# Patient Record
Sex: MALE | Race: BLACK OR AFRICAN AMERICAN | NOT HISPANIC OR LATINO | Employment: OTHER | URBAN - METROPOLITAN AREA
[De-identification: names, ages, dates, MRNs, and addresses within clinical notes are randomized per-mention and may not be internally consistent; named-entity substitution may affect disease eponyms.]

---

## 2023-07-13 ENCOUNTER — OFFICE VISIT (OUTPATIENT)
Dept: PRIMARY CARE | Facility: CLINIC | Age: 68
End: 2023-07-13
Payer: MEDICARE

## 2023-07-13 ENCOUNTER — LAB (OUTPATIENT)
Dept: LAB | Facility: LAB | Age: 68
End: 2023-07-13
Payer: MEDICARE

## 2023-07-13 VITALS
HEIGHT: 69 IN | DIASTOLIC BLOOD PRESSURE: 86 MMHG | WEIGHT: 196 LBS | SYSTOLIC BLOOD PRESSURE: 128 MMHG | HEART RATE: 83 BPM | BODY MASS INDEX: 29.03 KG/M2

## 2023-07-13 DIAGNOSIS — Z12.5 SCREENING FOR PROSTATE CANCER: ICD-10-CM

## 2023-07-13 DIAGNOSIS — Z00.00 ENCOUNTER FOR PREVENTIVE HEALTH EXAMINATION: ICD-10-CM

## 2023-07-13 DIAGNOSIS — Z00.00 ENCOUNTER FOR INITIAL PREVENTIVE PHYSICAL EXAMINATION COVERED BY MEDICARE: Primary | ICD-10-CM

## 2023-07-13 DIAGNOSIS — Z12.11 ENCOUNTER FOR SCREENING FOR MALIGNANT NEOPLASM OF COLON: ICD-10-CM

## 2023-07-13 DIAGNOSIS — Z23 NEED FOR VACCINATION: ICD-10-CM

## 2023-07-13 DIAGNOSIS — Z11.59 ENCOUNTER FOR HEPATITIS C SCREENING TEST FOR LOW RISK PATIENT: ICD-10-CM

## 2023-07-13 DIAGNOSIS — E78.5 HYPERLIPIDEMIA, UNSPECIFIED HYPERLIPIDEMIA TYPE: ICD-10-CM

## 2023-07-13 LAB
ALANINE AMINOTRANSFERASE (SGPT) (U/L) IN SER/PLAS: 30 U/L (ref 10–52)
ALBUMIN (G/DL) IN SER/PLAS: 4.5 G/DL (ref 3.4–5)
ALKALINE PHOSPHATASE (U/L) IN SER/PLAS: 61 U/L (ref 33–136)
ANION GAP IN SER/PLAS: 14 MMOL/L (ref 10–20)
ASPARTATE AMINOTRANSFERASE (SGOT) (U/L) IN SER/PLAS: 21 U/L (ref 9–39)
BILIRUBIN TOTAL (MG/DL) IN SER/PLAS: 0.8 MG/DL (ref 0–1.2)
CALCIUM (MG/DL) IN SER/PLAS: 9.3 MG/DL (ref 8.6–10.3)
CARBON DIOXIDE, TOTAL (MMOL/L) IN SER/PLAS: 24 MMOL/L (ref 21–32)
CHLORIDE (MMOL/L) IN SER/PLAS: 106 MMOL/L (ref 98–107)
CHOLESTEROL (MG/DL) IN SER/PLAS: 164 MG/DL (ref 0–199)
CHOLESTEROL IN HDL (MG/DL) IN SER/PLAS: 50.8 MG/DL
CHOLESTEROL/HDL RATIO: 3.2
CREATININE (MG/DL) IN SER/PLAS: 1.15 MG/DL (ref 0.5–1.3)
ERYTHROCYTE DISTRIBUTION WIDTH (RATIO) BY AUTOMATED COUNT: 13.3 % (ref 11.5–14.5)
ERYTHROCYTE MEAN CORPUSCULAR HEMOGLOBIN CONCENTRATION (G/DL) BY AUTOMATED: 32.4 G/DL (ref 32–36)
ERYTHROCYTE MEAN CORPUSCULAR VOLUME (FL) BY AUTOMATED COUNT: 87 FL (ref 80–100)
ERYTHROCYTES (10*6/UL) IN BLOOD BY AUTOMATED COUNT: 4.39 X10E12/L (ref 4.5–5.9)
ESTIMATED AVERAGE GLUCOSE FOR HBA1C: 131 MG/DL
GFR MALE: 70 ML/MIN/1.73M2
GLUCOSE (MG/DL) IN SER/PLAS: 75 MG/DL (ref 74–99)
HEMATOCRIT (%) IN BLOOD BY AUTOMATED COUNT: 38.3 % (ref 41–52)
HEMOGLOBIN (G/DL) IN BLOOD: 12.4 G/DL (ref 13.5–17.5)
HEMOGLOBIN A1C/HEMOGLOBIN TOTAL IN BLOOD: 6.2 %
HEPATITIS C VIRUS AB PRESENCE IN SERUM: NONREACTIVE
LDL: 101 MG/DL (ref 0–99)
LEUKOCYTES (10*3/UL) IN BLOOD BY AUTOMATED COUNT: 3.1 X10E9/L (ref 4.4–11.3)
PLATELETS (10*3/UL) IN BLOOD AUTOMATED COUNT: 218 X10E9/L (ref 150–450)
POTASSIUM (MMOL/L) IN SER/PLAS: 4.6 MMOL/L (ref 3.5–5.3)
PROSTATE SPECIFIC ANTIGEN,SCREEN: 0.51 NG/ML (ref 0–4)
PROTEIN TOTAL: 7.4 G/DL (ref 6.4–8.2)
SODIUM (MMOL/L) IN SER/PLAS: 139 MMOL/L (ref 136–145)
THYROTROPIN (MIU/L) IN SER/PLAS BY DETECTION LIMIT <= 0.05 MIU/L: 4.07 MIU/L (ref 0.44–3.98)
THYROXINE (T4) FREE (NG/DL) IN SER/PLAS: 0.61 NG/DL (ref 0.61–1.12)
TRIGLYCERIDE (MG/DL) IN SER/PLAS: 61 MG/DL (ref 0–149)
UREA NITROGEN (MG/DL) IN SER/PLAS: 17 MG/DL (ref 6–23)
VLDL: 12 MG/DL (ref 0–40)

## 2023-07-13 PROCEDURE — 1157F ADVNC CARE PLAN IN RCRD: CPT | Performed by: STUDENT IN AN ORGANIZED HEALTH CARE EDUCATION/TRAINING PROGRAM

## 2023-07-13 PROCEDURE — 1036F TOBACCO NON-USER: CPT | Performed by: STUDENT IN AN ORGANIZED HEALTH CARE EDUCATION/TRAINING PROGRAM

## 2023-07-13 PROCEDURE — G0009 ADMIN PNEUMOCOCCAL VACCINE: HCPCS | Performed by: STUDENT IN AN ORGANIZED HEALTH CARE EDUCATION/TRAINING PROGRAM

## 2023-07-13 PROCEDURE — 1170F FXNL STATUS ASSESSED: CPT | Performed by: STUDENT IN AN ORGANIZED HEALTH CARE EDUCATION/TRAINING PROGRAM

## 2023-07-13 PROCEDURE — 36415 COLL VENOUS BLD VENIPUNCTURE: CPT

## 2023-07-13 PROCEDURE — 86803 HEPATITIS C AB TEST: CPT

## 2023-07-13 PROCEDURE — 80061 LIPID PANEL: CPT

## 2023-07-13 PROCEDURE — G0402 INITIAL PREVENTIVE EXAM: HCPCS | Performed by: STUDENT IN AN ORGANIZED HEALTH CARE EDUCATION/TRAINING PROGRAM

## 2023-07-13 PROCEDURE — 80053 COMPREHEN METABOLIC PANEL: CPT

## 2023-07-13 PROCEDURE — 83036 HEMOGLOBIN GLYCOSYLATED A1C: CPT

## 2023-07-13 PROCEDURE — 84443 ASSAY THYROID STIM HORMONE: CPT

## 2023-07-13 PROCEDURE — 1160F RVW MEDS BY RX/DR IN RCRD: CPT | Performed by: STUDENT IN AN ORGANIZED HEALTH CARE EDUCATION/TRAINING PROGRAM

## 2023-07-13 PROCEDURE — 85027 COMPLETE CBC AUTOMATED: CPT

## 2023-07-13 PROCEDURE — 1159F MED LIST DOCD IN RCRD: CPT | Performed by: STUDENT IN AN ORGANIZED HEALTH CARE EDUCATION/TRAINING PROGRAM

## 2023-07-13 PROCEDURE — 99203 OFFICE O/P NEW LOW 30 MIN: CPT | Performed by: STUDENT IN AN ORGANIZED HEALTH CARE EDUCATION/TRAINING PROGRAM

## 2023-07-13 PROCEDURE — 84439 ASSAY OF FREE THYROXINE: CPT

## 2023-07-13 PROCEDURE — 90732 PPSV23 VACC 2 YRS+ SUBQ/IM: CPT | Performed by: STUDENT IN AN ORGANIZED HEALTH CARE EDUCATION/TRAINING PROGRAM

## 2023-07-13 PROCEDURE — G0103 PSA SCREENING: HCPCS

## 2023-07-13 PROCEDURE — 3008F BODY MASS INDEX DOCD: CPT | Performed by: STUDENT IN AN ORGANIZED HEALTH CARE EDUCATION/TRAINING PROGRAM

## 2023-07-13 RX ORDER — ROSUVASTATIN CALCIUM 5 MG/1
5 TABLET, COATED ORAL DAILY
COMMUNITY
End: 2023-07-14 | Stop reason: SDUPTHER

## 2023-07-13 ASSESSMENT — PATIENT HEALTH QUESTIONNAIRE - PHQ9
1. LITTLE INTEREST OR PLEASURE IN DOING THINGS: NOT AT ALL
2. FEELING DOWN, DEPRESSED OR HOPELESS: NOT AT ALL
SUM OF ALL RESPONSES TO PHQ9 QUESTIONS 1 AND 2: 0

## 2023-07-13 ASSESSMENT — ENCOUNTER SYMPTOMS
LOSS OF SENSATION IN FEET: 0
DEPRESSION: 0
OCCASIONAL FEELINGS OF UNSTEADINESS: 0

## 2023-07-13 ASSESSMENT — ACTIVITIES OF DAILY LIVING (ADL)
MANAGING_FINANCES: INDEPENDENT
TAKING_MEDICATION: INDEPENDENT
DRESSING: INDEPENDENT
GROCERY_SHOPPING: INDEPENDENT
DOING_HOUSEWORK: INDEPENDENT
BATHING: INDEPENDENT

## 2023-07-13 NOTE — PROGRESS NOTES
Subjective   Reason for Visit: Michael Chavez is an 67 y.o. male here for a Medicare Wellness visit.     Past Medical, Surgical, and Family History reviewed and updated in chart.    Reviewed all medications by prescribing practitioner or clinical pharmacist (such as prescriptions, OTCs, herbal therapies and supplements) and documented in the medical record.    HPI  Patient moved here from [place].  It has been about 3 years since he last saw his primary doctor, so it has been about that amount of time since he had any fasting lab work.  He did present fasting today in order to get his usual health maintenance labs.  He has been on rosuvastatin for some time now for cholesterol.  He used to be on 10 mg and he cut that back to 5 mg the last time he saw his doctor.  He has been trying to conserve his medication, so he has not really been taking it every day lately.  He is interested to see what his cholesterol looks like.  He will need a refill.    Of note, he states that he has had some palpitations over the last few months that he thinks is related to drinking too much caffeine.  He has scheduled an appointment with a cardiologist for further investigation of these palpitations already.  He has not had any syncopal episodes, denies chest pain, shortness of breath, headaches, visual changes, weakness, numbness, tingling, lower extremity edema.  Patient Self Assessment of Health Status  Patient Self Assessment: Good    Nutrition and Exercise  Current Diet: Well Balanced Diet  Adequate Fluid Intake: Yes  Caffeine: Yes  Exercise Frequency: Infrequently    Functional Ability/Level of Safety  Cognitive Impairment Observed: No cognitive impairment observed  Cognitive Impairment Reported: No cognitive impairment reported by patient or family    Home Safety Risk Factors: None    Patient Care Team:  Connie Knott DO as PCP - General (Family Medicine)     Review of Systems  All pertinent positive symptoms are included  "in history of present illness.    All other systems have been reviewed and are negative and noncontributory to this patient's current ailments.  Objective   Vitals:  /86   Pulse 83   Ht 1.753 m (5' 9\")   Wt 88.9 kg (196 lb)   BMI 28.94 kg/m²       Physical Exam  CONSTITUTIONAL - INAD. Not ill appearing.  SKIN - No lesions or rashes visualized. Good skin turgor.  HEAD - Atraumatic, normocephalic.  EYES - EOMI, NAINA  ENT - EACs clear. TMs intact. Nasal turbinates without erythema or discharge. Uvula midline, oropharynx nonerythematous and without exudate.  NECK - Supple and without rigidity. Thyroid midline and without masses.  RESP - CTAB. No wheezing, rhonchi, or crackles.   CARDIAC - RRR. No murmurs, gallops, or rubs.  ABDOMEN - Soft, nontender, nondistended. No organomegaly.   EXTREMITIES -  No edema or cyanosis  MUSCULOSKELETAL - No joint swelling or deformities. Good muscle tone in the UE/LE bilaterally.  NEUROLOGICAL - CNs 2-12 grossly intact. Reflexes intact in the LE.  PSYCHIATRIC - Displays normal mood and affect.     Assessment/Plan   Diagnoses and all orders for this visit:  Initial Medicare annual wellness visit  Encounter for preventive health examination  A complete history and physical was performed today.  Fasting labs ordered today include:  -     CBC; Future  -     Comprehensive Metabolic Panel; Future  -     Hemoglobin A1C; Future  -     Lipid Panel; Future  -     TSH with reflex to Free T4 if abnormal; Future  -     Hepatitis C Antibody; Future  Screening for prostate cancer  -     Prostate Spec.Ag,Screen; Future  Need for vaccination  -     Pneumococcal polysaccharide vaccine, 23-valent, age 2 years and older (PNEUMOVAX 23)  Encounter for screening for malignant neoplasm of colon  -     Colonoscopy; Future  Hyperlipidemia, unspecified hyperlipidemia type  Patient currently taking rosuvastatin 5 mg most days of the week.  Will hold off on the refill until his lipid panel results  BMI " 28.0-28.9,adult  Continue a well-balanced diet with regular physical activity

## 2023-07-14 DIAGNOSIS — D64.9 ANEMIA, UNSPECIFIED TYPE: ICD-10-CM

## 2023-07-14 DIAGNOSIS — R79.89 ABNORMAL TSH: Primary | ICD-10-CM

## 2023-07-14 DIAGNOSIS — E78.00 PURE HYPERCHOLESTEROLEMIA: ICD-10-CM

## 2023-07-14 RX ORDER — ROSUVASTATIN CALCIUM 5 MG/1
5 TABLET, COATED ORAL DAILY
Qty: 90 TABLET | Refills: 1 | Status: SHIPPED | OUTPATIENT
Start: 2023-07-14 | End: 2024-01-08

## 2023-10-12 ENCOUNTER — LAB (OUTPATIENT)
Dept: LAB | Facility: LAB | Age: 68
End: 2023-10-12
Payer: MEDICARE

## 2023-10-12 DIAGNOSIS — D64.9 ANEMIA, UNSPECIFIED TYPE: ICD-10-CM

## 2023-10-12 DIAGNOSIS — R79.89 ABNORMAL TSH: ICD-10-CM

## 2023-10-12 LAB
BASOPHILS # BLD AUTO: 0.04 X10*3/UL (ref 0–0.1)
BASOPHILS NFR BLD AUTO: 1.3 %
EOSINOPHIL # BLD AUTO: 0.05 X10*3/UL (ref 0–0.7)
EOSINOPHIL NFR BLD AUTO: 1.7 %
ERYTHROCYTE [DISTWIDTH] IN BLOOD BY AUTOMATED COUNT: 13.2 % (ref 11.5–14.5)
FERRITIN SERPL-MCNC: 388 NG/ML (ref 20–300)
HCT VFR BLD AUTO: 39.7 % (ref 41–52)
HGB BLD-MCNC: 13.5 G/DL (ref 13.5–17.5)
IMM GRANULOCYTES # BLD AUTO: 0 X10*3/UL (ref 0–0.7)
IMM GRANULOCYTES NFR BLD AUTO: 0 % (ref 0–0.9)
IRON SATN MFR SERPL: 42 % (ref 25–45)
IRON SERPL-MCNC: 143 UG/DL (ref 35–150)
LYMPHOCYTES # BLD AUTO: 1.04 X10*3/UL (ref 1.2–4.8)
LYMPHOCYTES NFR BLD AUTO: 34.9 %
MCH RBC QN AUTO: 29.7 PG (ref 26–34)
MCHC RBC AUTO-ENTMCNC: 34 G/DL (ref 32–36)
MCV RBC AUTO: 87 FL (ref 80–100)
MONOCYTES # BLD AUTO: 0.43 X10*3/UL (ref 0.1–1)
MONOCYTES NFR BLD AUTO: 14.4 %
NEUTROPHILS # BLD AUTO: 1.42 X10*3/UL (ref 1.2–7.7)
NEUTROPHILS NFR BLD AUTO: 47.7 %
NRBC BLD-RTO: 0 /100 WBCS (ref 0–0)
PLATELET # BLD AUTO: 207 X10*3/UL (ref 150–450)
PMV BLD AUTO: 11.1 FL (ref 7.5–11.5)
RBC # BLD AUTO: 4.55 X10*6/UL (ref 4.5–5.9)
TIBC SERPL-MCNC: 337 UG/DL (ref 240–445)
TSH SERPL-ACNC: 2.52 MIU/L (ref 0.44–3.98)
UIBC SERPL-MCNC: 194 UG/DL (ref 110–370)
VIT B12 SERPL-MCNC: 462 PG/ML (ref 211–911)
WBC # BLD AUTO: 3 X10*3/UL (ref 4.4–11.3)

## 2023-10-12 PROCEDURE — 84443 ASSAY THYROID STIM HORMONE: CPT

## 2023-10-12 PROCEDURE — 83550 IRON BINDING TEST: CPT

## 2023-10-12 PROCEDURE — 83540 ASSAY OF IRON: CPT

## 2023-10-12 PROCEDURE — 85025 COMPLETE CBC W/AUTO DIFF WBC: CPT

## 2023-10-12 PROCEDURE — 36415 COLL VENOUS BLD VENIPUNCTURE: CPT

## 2023-10-12 PROCEDURE — 82607 VITAMIN B-12: CPT

## 2023-10-12 PROCEDURE — 82728 ASSAY OF FERRITIN: CPT

## 2023-10-31 ENCOUNTER — LAB (OUTPATIENT)
Dept: LAB | Facility: LAB | Age: 68
End: 2023-10-31
Payer: MEDICARE

## 2023-10-31 ENCOUNTER — OFFICE VISIT (OUTPATIENT)
Dept: PRIMARY CARE | Facility: CLINIC | Age: 68
End: 2023-10-31
Payer: MEDICARE

## 2023-10-31 VITALS
HEIGHT: 69 IN | DIASTOLIC BLOOD PRESSURE: 82 MMHG | HEART RATE: 110 BPM | BODY MASS INDEX: 27.99 KG/M2 | WEIGHT: 189 LBS | SYSTOLIC BLOOD PRESSURE: 126 MMHG

## 2023-10-31 DIAGNOSIS — R53.83 OTHER FATIGUE: Primary | ICD-10-CM

## 2023-10-31 DIAGNOSIS — R73.03 PREDIABETES: ICD-10-CM

## 2023-10-31 DIAGNOSIS — R53.83 OTHER FATIGUE: ICD-10-CM

## 2023-10-31 PROCEDURE — 1159F MED LIST DOCD IN RCRD: CPT | Performed by: STUDENT IN AN ORGANIZED HEALTH CARE EDUCATION/TRAINING PROGRAM

## 2023-10-31 PROCEDURE — 36415 COLL VENOUS BLD VENIPUNCTURE: CPT

## 2023-10-31 PROCEDURE — 84403 ASSAY OF TOTAL TESTOSTERONE: CPT

## 2023-10-31 PROCEDURE — 83036 HEMOGLOBIN GLYCOSYLATED A1C: CPT

## 2023-10-31 PROCEDURE — 99213 OFFICE O/P EST LOW 20 MIN: CPT | Performed by: STUDENT IN AN ORGANIZED HEALTH CARE EDUCATION/TRAINING PROGRAM

## 2023-10-31 PROCEDURE — 1160F RVW MEDS BY RX/DR IN RCRD: CPT | Performed by: STUDENT IN AN ORGANIZED HEALTH CARE EDUCATION/TRAINING PROGRAM

## 2023-10-31 PROCEDURE — 3008F BODY MASS INDEX DOCD: CPT | Performed by: STUDENT IN AN ORGANIZED HEALTH CARE EDUCATION/TRAINING PROGRAM

## 2023-10-31 PROCEDURE — 1036F TOBACCO NON-USER: CPT | Performed by: STUDENT IN AN ORGANIZED HEALTH CARE EDUCATION/TRAINING PROGRAM

## 2023-10-31 NOTE — PROGRESS NOTES
"Michael Chavez is a 67 y.o. year old male presenting for Follow-up       HPI:  Patient states that for the last 1 to 2 months, he has been feeling pretty fatigued to the point where he feels like he needs a nap at some point during the day.  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  He will go to bed around 5 AM and sleep until about 1 PM.  When he wakes up, he feels rested and ready for the day, but at the 4 to 5-hour lulu after being awake, he just feels like he needs a nap.  This is unusual for him and is starting to be concerning to him.  He is wondering if it could be diabetes at this point since his A1c in July was elevated.  He never did schedule his colonoscopy and we know that he does have some anemia.  He does not snore and he does not have a headache when he wakes.  ROS:   All pertinent positive symptoms are included in history of present illness.    All other systems have been reviewed and are negative and noncontributory to this patient's current ailments.    Current Outpatient Medications   Medication Sig Dispense Refill    rosuvastatin (Crestor) 5 mg tablet Take 1 tablet (5 mg) by mouth once daily. 90 tablet 1     No current facility-administered medications for this visit.       OBJECTIVE  Visit Vitals  /82   Pulse 110   Ht 1.753 m (5' 9\")   Wt 85.7 kg (189 lb)   BMI 27.91 kg/m²   Smoking Status Never   BSA 2.04 m²        Physical Exam:  GENERAL: Alert, oriented, pleasant, in no acute distress  HEENT: Head normocephalic, atraumatic  EXTREMITIES: no edema, no cyanosis  PSYCH: Appropriate mood and affect  SKIN: No rashes or lesions appreciated    Assessment/Plan   Diagnoses and all orders for this visit:  Other fatigue  His anemia has improved with the iron supplementation, but he is still feeling fatigued.  We will go ahead and rule out testosterone deficiency as well as an increase in his A1c.  I did encourage him to schedule that " colonoscopy since he has been having these issues.  Does not sound like some sort of sleep disorder.  Thyroid function was normal, B12 was normal.  I recommend he also start a vitamin D supplement at this time  -     Testosterone; Future  -     Hemoglobin A1C; Future

## 2023-11-01 LAB
EST. AVERAGE GLUCOSE BLD GHB EST-MCNC: 123 MG/DL
HBA1C MFR BLD: 5.9 %
TESTOST SERPL-MCNC: 377 NG/DL (ref 240–1000)

## 2023-11-02 DIAGNOSIS — Z12.11 SPECIAL SCREENING FOR MALIGNANT NEOPLASMS, COLON: ICD-10-CM

## 2023-11-02 RX ORDER — POLYETHYLENE GLYCOL 3350, SODIUM SULFATE ANHYDROUS, SODIUM BICARBONATE, SODIUM CHLORIDE, POTASSIUM CHLORIDE 236; 22.74; 6.74; 5.86; 2.97 G/4L; G/4L; G/4L; G/4L; G/4L
POWDER, FOR SOLUTION ORAL
Qty: 4000 ML | Refills: 0 | Status: SHIPPED | OUTPATIENT
Start: 2023-11-02

## 2023-11-06 ENCOUNTER — OFFICE VISIT (OUTPATIENT)
Dept: HEMATOLOGY/ONCOLOGY | Facility: CLINIC | Age: 68
End: 2023-11-06
Payer: MEDICARE

## 2023-11-06 ENCOUNTER — TELEPHONE (OUTPATIENT)
Dept: PRIMARY CARE | Facility: CLINIC | Age: 68
End: 2023-11-06
Payer: MEDICARE

## 2023-11-06 DIAGNOSIS — D64.9 ANEMIA, UNSPECIFIED TYPE: ICD-10-CM

## 2023-11-06 DIAGNOSIS — D64.9 ANEMIA, UNSPECIFIED TYPE: Primary | ICD-10-CM

## 2023-11-06 NOTE — TELEPHONE ENCOUNTER
Pt calling requesting a referral for the Hematologist. He states he is concerned about the anemia as he has been taking iron for a while.

## 2023-11-15 ENCOUNTER — OFFICE VISIT (OUTPATIENT)
Dept: GASTROENTEROLOGY | Facility: EXTERNAL LOCATION | Age: 68
End: 2023-11-15
Payer: MEDICARE

## 2023-11-15 DIAGNOSIS — K57.30 DIVERTICULOSIS OF LARGE INTESTINE WITHOUT DIVERTICULITIS: ICD-10-CM

## 2023-11-15 DIAGNOSIS — K64.8 OTHER HEMORRHOIDS: Primary | ICD-10-CM

## 2023-11-15 DIAGNOSIS — K62.89 OTHER SPECIFIED DISEASES OF ANUS AND RECTUM: ICD-10-CM

## 2023-11-15 DIAGNOSIS — D12.5 BENIGN NEOPLASM OF SIGMOID COLON: ICD-10-CM

## 2023-11-15 DIAGNOSIS — Z12.11 ENCOUNTER FOR SCREENING FOR MALIGNANT NEOPLASM OF COLON: ICD-10-CM

## 2023-11-15 DIAGNOSIS — D12.8 BENIGN NEOPLASM OF RECTUM: ICD-10-CM

## 2023-11-15 PROCEDURE — 1036F TOBACCO NON-USER: CPT | Performed by: INTERNAL MEDICINE

## 2023-11-15 PROCEDURE — 88305 TISSUE EXAM BY PATHOLOGIST: CPT

## 2023-11-15 PROCEDURE — 1160F RVW MEDS BY RX/DR IN RCRD: CPT | Performed by: INTERNAL MEDICINE

## 2023-11-15 PROCEDURE — 3008F BODY MASS INDEX DOCD: CPT | Performed by: INTERNAL MEDICINE

## 2023-11-15 PROCEDURE — 45385 COLONOSCOPY W/LESION REMOVAL: CPT | Performed by: INTERNAL MEDICINE

## 2023-11-15 PROCEDURE — 0753T DGTZ GLS MCRSCP SLD LEVEL IV: CPT

## 2023-11-15 PROCEDURE — 1159F MED LIST DOCD IN RCRD: CPT | Performed by: INTERNAL MEDICINE

## 2023-11-16 ENCOUNTER — LAB REQUISITION (OUTPATIENT)
Dept: LAB | Facility: HOSPITAL | Age: 68
End: 2023-11-16
Payer: MEDICARE

## 2023-11-28 ENCOUNTER — TELEPHONE (OUTPATIENT)
Dept: PRIMARY CARE | Facility: CLINIC | Age: 68
End: 2023-11-28
Payer: MEDICARE

## 2023-11-28 DIAGNOSIS — D72.819 LEUKOPENIA, UNSPECIFIED TYPE: Primary | ICD-10-CM

## 2023-11-28 DIAGNOSIS — R79.89 ELEVATED FERRITIN: ICD-10-CM

## 2023-11-28 PROBLEM — K62.9 ANORECTAL DISORDER: Status: ACTIVE | Noted: 2023-11-28

## 2023-11-28 PROBLEM — D12.5 BENIGN NEOPLASM OF SIGMOID COLON: Status: ACTIVE | Noted: 2023-11-28

## 2023-11-28 PROBLEM — R73.03 PREDIABETES: Status: ACTIVE | Noted: 2023-10-31

## 2023-11-28 PROBLEM — R53.83 FATIGUE: Status: ACTIVE | Noted: 2023-10-31

## 2023-11-28 PROBLEM — D12.8 BENIGN NEOPLASM OF RECTUM: Status: ACTIVE | Noted: 2023-11-28

## 2023-11-28 PROBLEM — K64.9 HEMORRHOIDS: Status: ACTIVE | Noted: 2023-11-28

## 2023-11-28 PROBLEM — E66.3 OVERWEIGHT WITH BODY MASS INDEX (BMI) 25.0-29.9: Status: ACTIVE | Noted: 2023-11-28

## 2023-11-28 PROBLEM — D64.9 ANEMIA: Status: ACTIVE | Noted: 2023-10-12

## 2023-11-28 PROBLEM — I10 ESSENTIAL (PRIMARY) HYPERTENSION: Status: ACTIVE | Noted: 2023-07-26

## 2023-11-28 RX ORDER — LIDOCAINE HYDROCHLORIDE 20 MG/ML
SOLUTION OROPHARYNGEAL
COMMUNITY

## 2023-11-28 NOTE — TELEPHONE ENCOUNTER
Pt has apt with hematology in two days.   He was requesting lab work from us prior so he can have results for that apt.

## 2023-11-29 ENCOUNTER — LAB (OUTPATIENT)
Dept: LAB | Facility: LAB | Age: 68
End: 2023-11-29
Payer: MEDICARE

## 2023-11-29 DIAGNOSIS — R79.89 ELEVATED FERRITIN: ICD-10-CM

## 2023-11-29 DIAGNOSIS — D72.819 LEUKOPENIA, UNSPECIFIED TYPE: ICD-10-CM

## 2023-11-29 LAB
BASOPHILS # BLD AUTO: 0.04 X10*3/UL (ref 0–0.1)
BASOPHILS NFR BLD AUTO: 1.1 %
EOSINOPHIL # BLD AUTO: 0.08 X10*3/UL (ref 0–0.7)
EOSINOPHIL NFR BLD AUTO: 2.3 %
ERYTHROCYTE [DISTWIDTH] IN BLOOD BY AUTOMATED COUNT: 12.8 % (ref 11.5–14.5)
FERRITIN SERPL-MCNC: 422 NG/ML (ref 20–300)
HCT VFR BLD AUTO: 42.1 % (ref 41–52)
HGB BLD-MCNC: 13.9 G/DL (ref 13.5–17.5)
IMM GRANULOCYTES # BLD AUTO: 0.01 X10*3/UL (ref 0–0.7)
IMM GRANULOCYTES NFR BLD AUTO: 0.3 % (ref 0–0.9)
LYMPHOCYTES # BLD AUTO: 1.31 X10*3/UL (ref 1.2–4.8)
LYMPHOCYTES NFR BLD AUTO: 37.3 %
MCH RBC QN AUTO: 29.4 PG (ref 26–34)
MCHC RBC AUTO-ENTMCNC: 33 G/DL (ref 32–36)
MCV RBC AUTO: 89 FL (ref 80–100)
MONOCYTES # BLD AUTO: 0.39 X10*3/UL (ref 0.1–1)
MONOCYTES NFR BLD AUTO: 11.1 %
NEUTROPHILS # BLD AUTO: 1.68 X10*3/UL (ref 1.2–7.7)
NEUTROPHILS NFR BLD AUTO: 47.9 %
NRBC BLD-RTO: 0 /100 WBCS (ref 0–0)
PLATELET # BLD AUTO: 214 X10*3/UL (ref 150–450)
RBC # BLD AUTO: 4.72 X10*6/UL (ref 4.5–5.9)
WBC # BLD AUTO: 3.5 X10*3/UL (ref 4.4–11.3)

## 2023-11-29 PROCEDURE — 36415 COLL VENOUS BLD VENIPUNCTURE: CPT

## 2023-11-29 PROCEDURE — 82728 ASSAY OF FERRITIN: CPT

## 2023-11-29 PROCEDURE — 85025 COMPLETE CBC W/AUTO DIFF WBC: CPT

## 2023-11-30 ENCOUNTER — OFFICE VISIT (OUTPATIENT)
Dept: HEMATOLOGY/ONCOLOGY | Facility: CLINIC | Age: 68
End: 2023-11-30
Payer: MEDICARE

## 2023-11-30 VITALS
OXYGEN SATURATION: 95 % | HEART RATE: 95 BPM | DIASTOLIC BLOOD PRESSURE: 94 MMHG | SYSTOLIC BLOOD PRESSURE: 139 MMHG | BODY MASS INDEX: 27.74 KG/M2 | HEIGHT: 69 IN | WEIGHT: 187.28 LBS | RESPIRATION RATE: 18 BRPM | TEMPERATURE: 98.6 F

## 2023-11-30 DIAGNOSIS — D72.819 LEUKOPENIA, UNSPECIFIED TYPE: Primary | ICD-10-CM

## 2023-11-30 LAB
ALBUMIN SERPL BCP-MCNC: 4.5 G/DL (ref 3.4–5)
ALP SERPL-CCNC: 51 U/L (ref 33–136)
ALT SERPL W P-5'-P-CCNC: 19 U/L (ref 10–52)
ANION GAP SERPL CALC-SCNC: 17 MMOL/L (ref 10–20)
AST SERPL W P-5'-P-CCNC: 15 U/L (ref 9–39)
BILIRUB SERPL-MCNC: 1.1 MG/DL (ref 0–1.2)
BUN SERPL-MCNC: 14 MG/DL (ref 6–23)
CALCIUM SERPL-MCNC: 9.4 MG/DL (ref 8.6–10.3)
CHLORIDE SERPL-SCNC: 105 MMOL/L (ref 98–107)
CO2 SERPL-SCNC: 23 MMOL/L (ref 21–32)
CREAT SERPL-MCNC: 1.12 MG/DL (ref 0.5–1.3)
GFR SERPL CREATININE-BSD FRML MDRD: 73 ML/MIN/1.73M*2
GLUCOSE SERPL-MCNC: 95 MG/DL (ref 74–99)
IRON SATN MFR SERPL: 28 % (ref 25–45)
IRON SERPL-MCNC: 91 UG/DL (ref 35–150)
POTASSIUM SERPL-SCNC: 4.2 MMOL/L (ref 3.5–5.3)
PROT SERPL-MCNC: 7 G/DL (ref 6.4–8.2)
SODIUM SERPL-SCNC: 141 MMOL/L (ref 136–145)
TIBC SERPL-MCNC: 322 UG/DL (ref 240–445)
UIBC SERPL-MCNC: 231 UG/DL (ref 110–370)

## 2023-11-30 PROCEDURE — 3008F BODY MASS INDEX DOCD: CPT | Performed by: PHYSICIAN ASSISTANT

## 2023-11-30 PROCEDURE — 86235 NUCLEAR ANTIGEN ANTIBODY: CPT | Performed by: PHYSICIAN ASSISTANT

## 2023-11-30 PROCEDURE — 3080F DIAST BP >= 90 MM HG: CPT | Performed by: PHYSICIAN ASSISTANT

## 2023-11-30 PROCEDURE — 99205 OFFICE O/P NEW HI 60 MIN: CPT | Performed by: PHYSICIAN ASSISTANT

## 2023-11-30 PROCEDURE — 1036F TOBACCO NON-USER: CPT | Performed by: PHYSICIAN ASSISTANT

## 2023-11-30 PROCEDURE — 3075F SYST BP GE 130 - 139MM HG: CPT | Performed by: PHYSICIAN ASSISTANT

## 2023-11-30 PROCEDURE — 1160F RVW MEDS BY RX/DR IN RCRD: CPT | Performed by: PHYSICIAN ASSISTANT

## 2023-11-30 PROCEDURE — 1159F MED LIST DOCD IN RCRD: CPT | Performed by: PHYSICIAN ASSISTANT

## 2023-11-30 PROCEDURE — 1125F AMNT PAIN NOTED PAIN PRSNT: CPT | Performed by: PHYSICIAN ASSISTANT

## 2023-11-30 PROCEDURE — 99215 OFFICE O/P EST HI 40 MIN: CPT | Mod: PO | Performed by: PHYSICIAN ASSISTANT

## 2023-11-30 PROCEDURE — 36415 COLL VENOUS BLD VENIPUNCTURE: CPT | Performed by: PHYSICIAN ASSISTANT

## 2023-11-30 ASSESSMENT — ENCOUNTER SYMPTOMS
LOSS OF SENSATION IN FEET: 0
DEPRESSION: 0
OCCASIONAL FEELINGS OF UNSTEADINESS: 0

## 2023-11-30 ASSESSMENT — COLUMBIA-SUICIDE SEVERITY RATING SCALE - C-SSRS
6. HAVE YOU EVER DONE ANYTHING, STARTED TO DO ANYTHING, OR PREPARED TO DO ANYTHING TO END YOUR LIFE?: NO
2. HAVE YOU ACTUALLY HAD ANY THOUGHTS OF KILLING YOURSELF?: NO
1. IN THE PAST MONTH, HAVE YOU WISHED YOU WERE DEAD OR WISHED YOU COULD GO TO SLEEP AND NOT WAKE UP?: NO

## 2023-11-30 ASSESSMENT — PAIN SCALES - GENERAL: PAINLEVEL: 5

## 2023-11-30 NOTE — PROGRESS NOTES
"Reason for Visit  Michael Chavez is a 67 y.o. AA male referred by Dr. Knott for evaluation and management of leucopenia w/o neutropenia or lymphopenia.     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      History of Present Illness:  Patient recently moved her from [place] reports he has had leucopenia for most of his life. Noted to have anemia in 7/2023 and self prescribed oral iron. He takes 2-3 tabs a day. No iron studies drawn at the time.  Anemia corrected and in 10/2023, labs drawn and noted to have elevated ferritin (388) and iron stat of 42%. Patient had C-scope (11/15/2023) had sigmoid colon and rectal polyp.    On assessment, notes fatigue but doesn't have conventional sleep wake cycle. He works at night and sleeps from 5am to 1pm. He notes 8lb weight loss in 2 month mounika. Notes back pain and stressful job. Denies B symptoms, frequent infections or any other symptoms at this time.    Review of Systems: All of the systems have been reviewed and are negative for complaints except what is stated in the HPI and/or past medical history.    Allergies and Intolerances:  No Known Allergies           Outpatient Medication Profile:  Current Outpatient Medications   Medication Sig Dispense Refill    lidocaine (Lidocaine Viscous) 2 % solution       polyethylene glycol-electrolytes (Golytely) 420 gram solution Begin drinking first half of prep 6pm the night before procedure. Start second half 5 hours before procedure time. 4000 mL 0    rosuvastatin (Crestor) 5 mg tablet Take 1 tablet (5 mg) by mouth once daily. 90 tablet 1     No current facility-administered medications for this visit.        Vitals and Measurements:   Visit Vitals  BP (!) 139/94 (BP Location: Left arm, Patient Position: Sitting, BP Cuff Size: Adult)   Pulse 95   Temp 37 °C (98.6 °F) (Temporal)   Resp 18        Physical Exam: " "  Constitutional: alert, awake and oriented, not in acute distress   HEENT: moist mucous membranes, normal nose   Neck: supple, no lymphadenopathy   EYES: PERRL, EOM intact, conjunctiva normal  Skin: no jaundice, rash or erythema  Neurological: AAOx3, no gross focal deficit   Psychiatric: normal mood and behavior     Labs:  Lab Results   Component Value Date    WBC 3.5 (L) 11/29/2023    NEUTROABS 1.68 11/29/2023    IGABSOL 0.01 11/29/2023    LYMPHSABS 1.31 11/29/2023    MONOSABS 0.39 11/29/2023    EOSABS 0.08 11/29/2023    BASOSABS 0.04 11/29/2023    RBC 4.72 11/29/2023    MCV 89 11/29/2023    MCHC 33.0 11/29/2023    HGB 13.9 11/29/2023    HCT 42.1 11/29/2023     11/29/2023     No results found for: \"RETICCTPCT\"   Lab Results   Component Value Date    CREATININE 1.12 11/30/2023    BUN 14 11/30/2023    EGFR 73 11/30/2023     11/30/2023    K 4.2 11/30/2023     11/30/2023    CO2 23 11/30/2023      Lab Results   Component Value Date    ALT 19 11/30/2023    AST 15 11/30/2023    ALKPHOS 51 11/30/2023    BILITOT 1.1 11/30/2023      Lab Results   Component Value Date    TSH 2.52 10/12/2023     Lab Results   Component Value Date    TSH 2.52 10/12/2023     Lab Results   Component Value Date    IRON 91 11/30/2023    TIBC 322 11/30/2023    FERRITIN 422 (H) 11/29/2023      Lab Results   Component Value Date    MLIXUSKV31 462 10/12/2023      Assessment:    67 y.o. AA male referred for evaluation and management of leucopenia w/o neutropenia or lymphopenia.     Plan:    Reviewed and discussed lab, imaging, and pathology results with patient in detail as well as diagnosis, prognosis, and treatment options.    Will send workup for leucopenia    Discussed completing anemia work up. Unclear if patient had PRATIBHA since iron labs weren't drawn prior to patient starting oral iron.    Discussed role of BMBx    Recommend that he try to modify his sleep cycle if he can since it can be cause of fatigue.    F/U w/PCP    Patient " verbalized understanding, and all his questions were answered to her satisfaction    60 min spent with patient greater than 50 % of which was spent in consultation and coordination of care.

## 2023-12-01 LAB
HAV AB SER QL IA: NONREACTIVE
HBV CORE AB SER QL: NONREACTIVE
HBV SURFACE AB SER-ACNC: <3.1 MIU/ML
HBV SURFACE AG SERPL QL IA: NONREACTIVE
HCV AB SER QL: NONREACTIVE
IGA SERPL-MCNC: 131 MG/DL (ref 70–400)
IGG SERPL-MCNC: 1340 MG/DL (ref 700–1600)
IGM SERPL-MCNC: 41 MG/DL (ref 40–230)
KAPPA LC SERPL-MCNC: 1.69 MG/DL (ref 0.33–1.94)
KAPPA LC/LAMBDA SER: 1.32 {RATIO} (ref 0.26–1.65)
LAMBDA LC SERPL-MCNC: 1.28 MG/DL (ref 0.57–2.63)
PROT SERPL-MCNC: 7.5 G/DL (ref 6.4–8.2)
RHEUMATOID FACT SER NEPH-ACNC: <10 IU/ML (ref 0–15)

## 2023-12-03 LAB
ANA PATTERN: ABNORMAL
ANA SER QL HEP2 SUBST: POSITIVE
ANA TITR SER IF: ABNORMAL {TITER}

## 2023-12-04 LAB
CENTROMERE B AB SER-ACNC: <0.2 AI
CHROMATIN AB SERPL-ACNC: <0.2 AI
DSDNA AB SER-ACNC: <1 IU/ML
ELECTRONICALLY SIGNED BY: NORMAL
ENA JO1 AB SER QL IA: <0.2 AI
ENA RNP AB SER IA-ACNC: 0.2 AI
ENA SCL70 AB SER QL IA: <0.2 AI
ENA SM AB SER IA-ACNC: <0.2 AI
ENA SM+RNP AB SER QL IA: <0.2 AI
ENA SS-A AB SER IA-ACNC: <0.2 AI
ENA SS-B AB SER IA-ACNC: <0.2 AI
HFE GENE MUT TESTED BLD/T: NORMAL
HFE P.C282Y BLD/T QL: NORMAL
HFE P.H63D BLD/T QL: NORMAL
RIBOSOMAL P AB SER-ACNC: <0.2 AI

## 2023-12-05 LAB
LABORATORY COMMENT REPORT: NORMAL
PATH REPORT.FINAL DX SPEC: NORMAL
PATH REPORT.GROSS SPEC: NORMAL
PATH REPORT.RELEVANT HX SPEC: NORMAL
PATH REPORT.TOTAL CANCER: NORMAL

## 2023-12-08 LAB
ALBUMIN: 4.7 G/DL (ref 3.4–5)
ALPHA 1 GLOBULIN: 0.3 G/DL (ref 0.2–0.6)
ALPHA 2 GLOBULIN: 0.6 G/DL (ref 0.4–1.1)
BETA GLOBULIN: 0.9 G/DL (ref 0.5–1.2)
GAMMA GLOBULIN: 1.1 G/DL (ref 0.5–1.4)
IMMUNOFIXATION COMMENT: ABNORMAL
M-PROTEIN 1: 0.1 G/DL
PATH REVIEW - SERUM IMMUNOFIXATION: ABNORMAL
PATH REVIEW-SERUM PROTEIN ELECTROPHORESIS: ABNORMAL
PROTEIN ELECTROPHORESIS COMMENT: ABNORMAL

## 2023-12-19 ENCOUNTER — APPOINTMENT (OUTPATIENT)
Dept: HEMATOLOGY/ONCOLOGY | Facility: CLINIC | Age: 68
End: 2023-12-19
Payer: COMMERCIAL

## 2023-12-20 ENCOUNTER — TELEMEDICINE (OUTPATIENT)
Dept: HEMATOLOGY/ONCOLOGY | Facility: CLINIC | Age: 68
End: 2023-12-20
Payer: MEDICARE

## 2023-12-20 DIAGNOSIS — D72.819 LEUKOPENIA, UNSPECIFIED TYPE: Primary | ICD-10-CM

## 2023-12-20 PROCEDURE — 99214 OFFICE O/P EST MOD 30 MIN: CPT | Performed by: PHYSICIAN ASSISTANT

## 2023-12-21 NOTE — PROGRESS NOTES
"Reason for Visit  Michael Chavez is a 68 y.o. AA male referred by Dr. Knott for evaluation and management of leucopenia w/o neutropenia or lymphopenia.     Patient recently moved her from [place] reports he has had leucopenia for most of his life. Noted to have anemia in 7/2023 and self prescribed oral iron. He takes 2-3 tabs a day. No iron studies drawn at the time.  Anemia corrected and in 10/2023, labs drawn and noted to have elevated ferritin (388) and iron stat of 42%. Patient had C-scope (11/15/2023) had sigmoid colon and rectal polyp.    On assessment, notes fatigue but doesn't have conventional sleep wake cycle. He works at night and sleeps from 5am to 1pm. He notes 8lb weight loss in 2 month mounika. Notes back pain and stressful job. Denies B symptoms, frequent infections or any other symptoms at this time.      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      History of Present Illness:  Patient stopped oral iron and feels better. States that he notes a \"sweet smell on himself\". Otherwise doing well.    Review of Systems: All of the systems have been reviewed and are negative for complaints except what is stated in the HPI and/or past medical history.    Allergies and Intolerances:  No Known Allergies           Outpatient Medication Profile:  Current Outpatient Medications   Medication Sig Dispense Refill    lidocaine (Lidocaine Viscous) 2 % solution       polyethylene glycol-electrolytes (Golytely) 420 gram solution Begin drinking first half of prep 6pm the night before procedure. Start second half 5 hours before procedure time. 4000 mL 0    rosuvastatin (Crestor) 5 mg tablet Take 1 tablet (5 mg) by mouth once daily. 90 tablet 1     No current facility-administered medications for this visit.        Vitals and Measurements:   There were no vitals taken for this visit.       Physical Exam: "   Deferred due to telehealth    Office Visit on 11/30/2023   Component Date Value Ref Range Status    Glucose 11/30/2023 95  74 - 99 mg/dL Final    Sodium 11/30/2023 141  136 - 145 mmol/L Final    Potassium 11/30/2023 4.2  3.5 - 5.3 mmol/L Final    Chloride 11/30/2023 105  98 - 107 mmol/L Final    Bicarbonate 11/30/2023 23  21 - 32 mmol/L Final    Anion Gap 11/30/2023 17  10 - 20 mmol/L Final    Urea Nitrogen 11/30/2023 14  6 - 23 mg/dL Final    Creatinine 11/30/2023 1.12  0.50 - 1.30 mg/dL Final    eGFR 11/30/2023 73  >60 mL/min/1.73m*2 Final    Calculations of estimated GFR are performed using the 2021 CKD-EPI Study Refit equation without the race variable for the IDMS-Traceable creatinine methods.  https://jasn.asnjournals.org/content/early/2021/09/22/ASN.9216731259    Calcium 11/30/2023 9.4  8.6 - 10.3 mg/dL Final    Albumin 11/30/2023 4.5  3.4 - 5.0 g/dL Final    Alkaline Phosphatase 11/30/2023 51  33 - 136 U/L Final    Total Protein 11/30/2023 7.0  6.4 - 8.2 g/dL Final    AST 11/30/2023 15  9 - 39 U/L Final    Bilirubin, Total 11/30/2023 1.1  0.0 - 1.2 mg/dL Final    ALT 11/30/2023 19  10 - 52 U/L Final    Patients treated with Sulfasalazine may generate falsely decreased results for ALT.    Iron 11/30/2023 91  35 - 150 ug/dL Final    UIBC 11/30/2023 231  110 - 370 ug/dL Final    TIBC 11/30/2023 322  240 - 445 ug/dL Final    % Saturation 11/30/2023 28  25 - 45 % Final    Hepatitis A  AB-Total 11/30/2023 Nonreactive  Nonreactive Final    Hepatitis B Core AB- Total 11/30/2023 Nonreactive  Nonreactive Final    Hepatitis B Surface AB 11/30/2023 <3.1  <10.0 mIU/mL Final    Interpretive Criteria:                         <10 mIU/mL    Nonreactive                          >=10 mIU/mL    Reactive     Biotin interference may cause falsely decreased results. Patients taking a Biotin dose of up to 5 mg/day should refrain from taking Biotin for 24 hours before sample collection. Providers may contact their local laboratory  for further information.    Hepatitis B Surface AG 11/30/2023 Nonreactive  Nonreactive Final    Biotin interference may cause falsely decreased results. Patients taking a Biotin dose of up to 5 mg/day should refrain from taking Biotin for 24 hours before sample collection. Providers may contact their local laboratory for  further information.    Hepatitis C AB 11/30/2023 Nonreactive  Nonreactive Final    Results from patients taking biotin supplements or receiving high-dose biotin therapy should be interpreted with caution due to possible interference with this test. Providers may contact their local laboratory for further information.    Ig [place] Free Light Chain 11/30/2023 1.69  0.33 - 1.94 mg/dL Final    Ig Lambda Free Light Chain 11/30/2023 1.28  0.57 - 2.63 mg/dL Final    Kappa/Lambda Ratio 11/30/2023 1.32  0.26 - 1.65 Final    IgG 11/30/2023 1,340  700 - 1,600 mg/dL Final    IgA 11/30/2023 131  70 - 400 mg/dL Final    IgM 11/30/2023 41  40 - 230 mg/dL Final    Hemochromatosis H63D Result 11/30/2023 Normal  Normal Final    Hemochromatosis C282Y Result 11/30/2023 Normal  Normal Final    Hemochromatosis Interpretation 11/30/2023    Final                    Value:This result contains rich text formatting which cannot be displayed here.    Electronically signed and reported* 11/30/2023    Final                    Value:Elizabeth Quintanilla MD PhD [facility]    SARINA 11/30/2023 Positive (A)  Negative Final    The Antinuclear Antibody (SARINA) test was performed using  indirect immunofluorescence assay with HEp-2 cells slide.    SARINA Pattern 11/30/2023 Homogeneous   Final    SARINA Titer 11/30/2023 1:320   Final    Rheumatoid Factor 11/30/2023 <10  0 - 15 IU/mL Final    Total Protein 11/30/2023 7.5  6.4 - 8.2 g/dL Final    Albumin 11/30/2023 4.7  3.4 - 5.0 g/dL Final    Alpha 1 Globulin 11/30/2023 0.3  0.2 - 0.6 g/dL Final    Alpha 2 Globulin 11/30/2023 0.6  0.4 - 1.1 g/dL Final    Beta Globulin 11/30/2023 0.9  0.5 - 1.2 g/dL Final     Gamma 11/30/2023 1.1  0.5 - 1.4 g/dL Final    M-PROTEIN 1 11/30/2023 0.1 (H)    g/dL Final    Protein Electrophoresis Comment 11/30/2023 Aberrant band detected. See immunofixation.   Final    Immunofixation Comment 11/30/2023 11/30/23 Monoclonal IgG lambda in the beta region at 0.1 g/dL.       Repeat testing is recommended after the resolution of any acute illness to monitor the evolution of this band.      Final    Path Review - Serum Protein Electr* 11/30/2023 Reviewed and approved by KEEGAN YU on 12/8/23 at 1:30 PM.       Final    Path Review - Serum Immunofixation 11/30/2023 Reviewed and approved by KEEGAN YU on 12/8/23 at 1:30 PM.       Final    Anti-SM 11/30/2023 <0.2  <1.0 AI Final    < 1.0 = NEGATIVE  >=1.0 = POSITIVE    Anti-RNP 11/30/2023 0.2  <1.0 AI Final    < 1.0 = NEGATIVE  >=1.0 = POSITIVE    Anti-SM/RNP 11/30/2023 <0.2  <1.0 AI Final    < 1.0 = NEGATIVE  >=1.0 = POSITIVE    Anti-SSA 11/30/2023 <0.2  <1.0 AI Final    < 1.0 = NEGATIVE  >=1.0 = POSITIVE    Anti-SSB 11/30/2023 <0.2  <1.0 AI Final    < 1.0 = NEGATIVE  >=1.0 = POSITIVE    Anti-SCL-70 11/30/2023 <0.2  <1.0 AI Final    < 1.0 = NEGATIVE  >=1.0 = POSITIVE    Anti-AKIL-1 IgG 11/30/2023 <0.2  <1.0 AI Final    < 1.0 = NEGATIVE  >=1.0 = POSITIVE    Anti-Chromatin 11/30/2023 <0.2  <1.0 AI Final    < 1.0 = NEGATIVE  >=1.0 = POSITIVE    Anti-Centromere 11/30/2023 <0.2  <1.0 AI Final    < 1.0 = NEGATIVE  >=1.0 = POSITIVE    ANTI-RIBOSOMAL P 11/30/2023 <0.2  <1.0 AI Final    < 1.0 = NEGATIVE  >=1.0 = POSITIVE    Anti-DNA (DS) 11/30/2023 <1.0  <5.0 IU/mL Final    NEGATIVE: <= 4 IU/ML  EQUIVOCAL: 5- 9 IU/ML  POSITIVE: >=10 IU/ML   Lab on 11/29/2023   Component Date Value Ref Range Status    WBC 11/29/2023 3.5 (L)  4.4 - 11.3 x10*3/uL Final    nRBC 11/29/2023 0.0  0.0 - 0.0 /100 WBCs Final    RBC 11/29/2023 4.72  4.50 - 5.90 x10*6/uL Final    Hemoglobin 11/29/2023 13.9  13.5 - 17.5 g/dL Final    Hematocrit 11/29/2023 42.1  41.0 - 52.0 % Final     MCV 11/29/2023 89  80 - 100 fL Final    MCH 11/29/2023 29.4  26.0 - 34.0 pg Final    MCHC 11/29/2023 33.0  32.0 - 36.0 g/dL Final    RDW 11/29/2023 12.8  11.5 - 14.5 % Final    Platelets 11/29/2023 214  150 - 450 x10*3/uL Final    Neutrophils % 11/29/2023 47.9  40.0 - 80.0 % Final    Immature Granulocytes %, Automated 11/29/2023 0.3  0.0 - 0.9 % Final    Immature Granulocyte Count (IG) includes promyelocytes, myelocytes and metamyelocytes but does not include bands. Percent differential counts (%) should be interpreted in the context of the absolute cell counts (cells/UL).    Lymphocytes % 11/29/2023 37.3  13.0 - 44.0 % Final    Monocytes % 11/29/2023 11.1  2.0 - 10.0 % Final    Eosinophils % 11/29/2023 2.3  0.0 - 6.0 % Final    Basophils % 11/29/2023 1.1  0.0 - 2.0 % Final    Neutrophils Absolute 11/29/2023 1.68  1.20 - 7.70 x10*3/uL Final    Percent differential counts (%) should be interpreted in the context of the absolute cell counts (cells/uL).    Immature Granulocytes Absolute, Au* 11/29/2023 0.01  0.00 - 0.70 x10*3/uL Final    Lymphocytes Absolute 11/29/2023 1.31  1.20 - 4.80 x10*3/uL Final    Monocytes Absolute 11/29/2023 0.39  0.10 - 1.00 x10*3/uL Final    Eosinophils Absolute 11/29/2023 0.08  0.00 - 0.70 x10*3/uL Final    Basophils Absolute 11/29/2023 0.04  0.00 - 0.10 x10*3/uL Final    Ferritin 11/29/2023 422 (H)  20 - 300 ng/mL Final   Lab Requisition on 11/15/2023   Component Date Value Ref Range Status    Case Report 11/15/2023    Final                    Value:Surgical Pathology                                Case: [id number]                                  Authorizing Provider:  Brad Yang MD        Collected:           11/15/2023 1224              Ordering Location:     [facility]       Received:            11/16/2023 2222                                     Center                                                                       Pathologist:           Meliton Nazario,                                                                            MD                                                                           Specimens:   A) - COLON - SIGMOID POLYP, COLON, SIGMOID POLYP, COLD SNARE, POLYPECTOMY                           B) - RECTUM POLYPECTOMY, COLON, RECTUM POLYP, COLD SNARE, POLYPECTOMY                      FINAL DIAGNOSIS 11/15/2023    Final                    Value:This result contains rich text formatting which cannot be displayed here.      11/15/2023    Final                    Value:This result contains rich text formatting which cannot be displayed here.    Clinical History 11/15/2023    Final                    Value:This result contains rich text formatting which cannot be displayed here.    Gross Description 11/15/2023    Final                    Value:This result contains rich text formatting which cannot be displayed here.   Lab on 10/31/2023   Component Date Value Ref Range Status    Testosterone 10/31/2023 377  240 - 1,000 ng/dL Final    Hemoglobin A1C 10/31/2023 5.9 (H)  see below % Final    Hemoglobin variant detected which does not interfere with determination of Hemoglobin A1c. Hemoglobin identification can be ordered to characterize the variant if clinically indicated.    Estimated Average Glucose 10/31/2023 123  Not Established mg/dL Final   Lab on 10/12/2023   Component Date Value Ref Range Status    Thyroid Stimulating Hormone 10/12/2023 2.52  0.44 - 3.98 mIU/L Final    WBC 10/12/2023 3.0 (L)  4.4 - 11.3 x10*3/uL Final    nRBC 10/12/2023 0.0  0.0 - 0.0 /100 WBCs Final    RBC 10/12/2023 4.55  4.50 - 5.90 x10*6/uL Final    Hemoglobin 10/12/2023 13.5  13.5 - 17.5 g/dL Final    Hematocrit 10/12/2023 39.7 (L)  41.0 - 52.0 % Final    MCV 10/12/2023 87  80 - 100 fL Final    MCH 10/12/2023 29.7  26.0 - 34.0 pg Final    MCHC 10/12/2023 34.0  32.0 - 36.0 g/dL Final    RDW 10/12/2023 13.2  11.5 - 14.5 % Final    Platelets 10/12/2023 207  150 - 450 x10*3/uL  Final    MPV 10/12/2023 11.1  7.5 - 11.5 fL Final    Neutrophils % 10/12/2023 47.7  40.0 - 80.0 % Final    Immature Granulocytes %, Automated 10/12/2023 0.0  0.0 - 0.9 % Final    Immature Granulocyte Count (IG) includes promyelocytes, myelocytes and metamyelocytes but does not include bands. Percent differential counts (%) should be interpreted in the context of the absolute cell counts (cells/UL).    Lymphocytes % 10/12/2023 34.9  13.0 - 44.0 % Final    Monocytes % 10/12/2023 14.4  2.0 - 10.0 % Final    Eosinophils % 10/12/2023 1.7  0.0 - 6.0 % Final    Basophils % 10/12/2023 1.3  0.0 - 2.0 % Final    Neutrophils Absolute 10/12/2023 1.42  1.20 - 7.70 x10*3/uL Final    Percent differential counts (%) should be interpreted in the context of the absolute cell counts (cells/uL).    Immature Granulocytes Absolute, Au* 10/12/2023 0.00  0.00 - 0.70 x10*3/uL Final    Lymphocytes Absolute 10/12/2023 1.04 (L)  1.20 - 4.80 x10*3/uL Final    Monocytes Absolute 10/12/2023 0.43  0.10 - 1.00 x10*3/uL Final    Eosinophils Absolute 10/12/2023 0.05  0.00 - 0.70 x10*3/uL Final    Basophils Absolute 10/12/2023 0.04  0.00 - 0.10 x10*3/uL Final    Iron 10/12/2023 143  35 - 150 ug/dL Final    UIBC 10/12/2023 194  110 - 370 ug/dL Final    TIBC 10/12/2023 337  240 - 445 ug/dL Final    % Saturation 10/12/2023 42  25 - 45 % Final    Ferritin 10/12/2023 388 (H)  20 - 300 ng/mL Final    Vitamin B12 10/12/2023 462  211 - 911 pg/mL Final   Lab on 07/13/2023   Component Date Value Ref Range Status    WBC 07/13/2023 3.1 (L)  4.4 - 11.3 x10E9/L Final    RBC 07/13/2023 4.39 (L)  4.50 - 5.90 x10E12/L Final    Hemoglobin 07/13/2023 12.4 (L)  13.5 - 17.5 g/dL Final    Hematocrit 07/13/2023 38.3 (L)  41.0 - 52.0 % Final    MCV 07/13/2023 87  80 - 100 fL Final    MCHC 07/13/2023 32.4  32.0 - 36.0 g/dL Final    Platelets 07/13/2023 218  150 - 450 x10E9/L Final    RDW 07/13/2023 13.3  11.5 - 14.5 % Final    Glucose 07/13/2023 75  74 - 99 mg/dL Final     Sodium 07/13/2023 139  136 - 145 mmol/L Final    Potassium 07/13/2023 4.6  3.5 - 5.3 mmol/L Final    Chloride 07/13/2023 106  98 - 107 mmol/L Final    Bicarbonate 07/13/2023 24  21 - 32 mmol/L Final    Anion Gap 07/13/2023 14  10 - 20 mmol/L Final    Urea Nitrogen 07/13/2023 17  6 - 23 mg/dL Final    Creatinine 07/13/2023 1.15  0.50 - 1.30 mg/dL Final    GFR MALE 07/13/2023 70  >90 mL/min/1.73m2 Final     CALCULATIONS OF ESTIMATED GFR ARE PERFORMED   USING THE 2021 CKD-EPI STUDY REFIT EQUATION   WITHOUT THE RACE VARIABLE FOR THE IDMS-TRACEABLE   CREATININE METHODS.    https://jasn.asnjournals.org/content/early/2021/09/22/ASN.2068709016    Calcium 07/13/2023 9.3  8.6 - 10.3 mg/dL Final    Albumin 07/13/2023 4.5  3.4 - 5.0 g/dL Final    Alkaline Phosphatase 07/13/2023 61  33 - 136 U/L Final    Total Protein 07/13/2023 7.4  6.4 - 8.2 g/dL Final    AST 07/13/2023 21  9 - 39 U/L Final    Total Bilirubin 07/13/2023 0.8  0.0 - 1.2 mg/dL Final    ALT (SGPT) 07/13/2023 30  10 - 52 U/L Final     Patients treated with Sulfasalazine may generate    falsely decreased results for ALT.    Hemoglobin A1C 07/13/2023 6.2 (A)  % Final         Diagnosis of Diabetes-Adults   Non-Diabetic: < or = 5.6%   Increased risk for developing diabetes: 5.7-6.4%   Diagnostic of diabetes: > or = 6.5%  .       Monitoring of Diabetes                Age (y)     Therapeutic Goal (%)   Adults:          >18           <7.0   Pediatrics:    13-18           <7.5                   7-12           <8.0                   0- 6            7.5-8.5   American Diabetes Association. Diabetes Care 33(S1), Jan 2010.    Estimated Average Glucose 07/13/2023 131  MG/DL Final    Cholesterol 07/13/2023 164  0 - 199 mg/dL Final    .      AGE      DESIRABLE   BORDERLINE HIGH   HIGH     0-19 Y     0 - 169       170 - 199     >/= 200    20-24 Y     0 - 189       190 - 224     >/= 225         >24 Y     0 - 199       200 - 239     >/= 240   **All ranges are based on fasting  samples. Specific   therapeutic targets will vary based on patient-specific   cardiac risk.  .   Pediatric guidelines reference:Pediatrics 2011, 128(S5).   Adult guidelines reference: NCEP ATPIII Guidelines,     AMANDA 2001, 258:2486-97  .   Venipuncture immediately after or during the    administration of Metamizole may lead to falsely   low results. Testing should be performed immediately   prior to Metamizole dosing.    HDL 07/13/2023 50.8  mg/dL Final    .      AGE      VERY LOW   LOW     NORMAL    HIGH       0-19 Y       < 35   < 40     40-45     ----    20-24 Y       ----   < 40       >45     ----      >24 Y       ----   < 40     40-60      >60  .    Cholesterol/HDL Ratio 07/13/2023 3.2   Final    REF VALUES  DESIRABLE  < 3.4  HIGH RISK  > 5.0    LDL 07/13/2023 101 (H)  0 - 99 mg/dL Final    .                           NEAR      BORD      AGE      DESIRABLE  OPTIMAL    HIGH     HIGH     VERY HIGH     0-19 Y     0 - 109     ---    110-129   >/= 130     ----    20-24 Y     0 - 119     ---    120-159   >/= 160     ----      >24 Y     0 -  99   100-129  130-159   160-189     >/=190  .    VLDL 07/13/2023 12  0 - 40 mg/dL Final    Triglycerides 07/13/2023 61  0 - 149 mg/dL Final    .      AGE      DESIRABLE   BORDERLINE HIGH   HIGH     VERY HIGH   0 D-90 D    19 - 174         ----         ----        ----  91 D- 9 Y     0 -  74        75 -  99     >/= 100      ----    10-19 Y     0 -  89        90 - 129     >/= 130      ----    20-24 Y     0 - 114       115 - 149     >/= 150      ----         >24 Y     0 - 149       150 - 199    200- 499    >/= 500  .   Venipuncture immediately after or during the    administration of Metamizole may lead to falsely   low results. Testing should be performed immediately   prior to Metamizole dosing.    TSH 07/13/2023 4.07 (H)  0.44 - 3.98 mIU/L Final     TSH testing is performed using different testing    methodology at [facility] than at other    [facility]. Direct  result comparisons should    only be made within the same method.    Hepatitis C Ab 07/13/2023 NONREACTIVE  NONREACTIVE Final     Results from patients taking biotin supplements or receiving   high-dose biotin therapy should be interpreted with caution   due to possible interference with this test. Providers may    contact their local laboratory for further information.    Prostate Specific Antigen,Screen 07/13/2023 0.51  0.00 - 4.00 ng/mL Final    The FDA requires that the method used for PSA assay be   reported to the physician. Values obtained with different   assay methods must not be used interchangeably. This test  was performed at [facility] using the Access   Hybritech PSA assay is a two-site immunoenzymatic sandwich   assay. The assay is approved for measurement of   prostate-specific antigen (PSA)in serum and may be used   in conjunction with a digital rectal examination in men   50 years and older as an aid in detection of prostate   cancer.  5-Alpha-reductase inhibitors (e.g. Proscar, Finasteride,   Avodart, Dutasteride and Ingris) for the treatment of BPH   have been shown to lower PSA levels by an average of 50%   after 6 months of treatment.    Free T4 07/13/2023 0.61  0.61 - 1.12 ng/dL Final     Thyroxine Free testing is performed using different testing    methodology at [facility] than at other    [facility]. Direct result comparisons should    only be made within the same method.  .   Biotin can cause falsely elevated free T4 results. Patients   taking a Biotin dose of up to 10 mg/day should refrain from   taking Biotin for 24 hours before sample collection. Patient   taking a Biotin dose of >10 mg/day should consult with their   physician or the laboratory before the blood draw.     Assessment:    67 y.o. AA male referred for evaluation and management of leucopenia w/o neutropenia or lymphopenia.     Plan:    Reviewed and discussed lab, imaging, and pathology  results with patient in detail as well as diagnosis, prognosis, and treatment options.    Long standing h/o leucopenia likely benign    Discussed role of BMBx    Recommend that he try to modify his sleep cycle if he can since it can be cause of fatigue.    F/U w/PCP    RTC in 6 months    Patient verbalized understanding, and all his questions were answered to her satisfaction    30 min spent with patient greater than 50 % of which was spent in consultation and coordination of care via telehealth.

## 2024-01-06 DIAGNOSIS — E78.00 PURE HYPERCHOLESTEROLEMIA: ICD-10-CM

## 2024-01-08 RX ORDER — ROSUVASTATIN CALCIUM 5 MG/1
5 TABLET, COATED ORAL DAILY
Qty: 90 TABLET | Refills: 0 | Status: SHIPPED | OUTPATIENT
Start: 2024-01-08

## 2024-02-09 DIAGNOSIS — R00.2 PALPITATIONS: ICD-10-CM

## 2024-02-09 DIAGNOSIS — E78.49 OTHER HYPERLIPIDEMIA: Primary | ICD-10-CM

## 2024-02-12 ENCOUNTER — LAB (OUTPATIENT)
Dept: LAB | Facility: LAB | Age: 69
End: 2024-02-12
Payer: MEDICARE

## 2024-02-12 DIAGNOSIS — E78.49 OTHER HYPERLIPIDEMIA: ICD-10-CM

## 2024-02-12 DIAGNOSIS — R00.2 PALPITATIONS: ICD-10-CM

## 2024-02-12 LAB
CHOLEST SERPL-MCNC: 184 MG/DL (ref 0–199)
CHOLESTEROL/HDL RATIO: 3.1
HDLC SERPL-MCNC: 58.7 MG/DL
LDLC SERPL CALC-MCNC: 113 MG/DL
NON HDL CHOLESTEROL: 125 MG/DL (ref 0–149)
TRIGL SERPL-MCNC: 61 MG/DL (ref 0–149)
VLDL: 12 MG/DL (ref 0–40)

## 2024-02-12 PROCEDURE — 80061 LIPID PANEL: CPT

## 2024-02-12 PROCEDURE — 36415 COLL VENOUS BLD VENIPUNCTURE: CPT

## 2024-06-12 DIAGNOSIS — D70.9 NEUTROPENIA, UNSPECIFIED TYPE (CMS-HCC): Primary | ICD-10-CM

## 2024-06-17 ENCOUNTER — LAB (OUTPATIENT)
Dept: LAB | Facility: LAB | Age: 69
End: 2024-06-17
Payer: MEDICARE

## 2024-06-17 DIAGNOSIS — D70.9 NEUTROPENIA, UNSPECIFIED TYPE (CMS-HCC): ICD-10-CM

## 2024-06-17 DIAGNOSIS — D72.819 LEUKOPENIA, UNSPECIFIED TYPE: ICD-10-CM

## 2024-06-17 LAB
ALBUMIN SERPL BCP-MCNC: 4.5 G/DL (ref 3.4–5)
ALP SERPL-CCNC: 45 U/L (ref 33–136)
ALT SERPL W P-5'-P-CCNC: 16 U/L (ref 10–52)
ANION GAP SERPL CALC-SCNC: 10 MMOL/L (ref 10–20)
AST SERPL W P-5'-P-CCNC: 17 U/L (ref 9–39)
BASOPHILS # BLD AUTO: 0.02 X10*3/UL (ref 0–0.1)
BASOPHILS NFR BLD AUTO: 0.8 %
BILIRUB SERPL-MCNC: 0.9 MG/DL (ref 0–1.2)
BUN SERPL-MCNC: 14 MG/DL (ref 6–23)
CALCIUM SERPL-MCNC: 9.3 MG/DL (ref 8.6–10.3)
CHLORIDE SERPL-SCNC: 109 MMOL/L (ref 98–107)
CO2 SERPL-SCNC: 25 MMOL/L (ref 21–32)
CREAT SERPL-MCNC: 1.16 MG/DL (ref 0.5–1.3)
EGFRCR SERPLBLD CKD-EPI 2021: 69 ML/MIN/1.73M*2
EOSINOPHIL # BLD AUTO: 0.09 X10*3/UL (ref 0–0.7)
EOSINOPHIL NFR BLD AUTO: 3.6 %
ERYTHROCYTE [DISTWIDTH] IN BLOOD BY AUTOMATED COUNT: 13.1 % (ref 11.5–14.5)
FERRITIN SERPL-MCNC: 351 NG/ML (ref 20–300)
GLUCOSE SERPL-MCNC: 102 MG/DL (ref 74–99)
HCT VFR BLD AUTO: 40.9 % (ref 41–52)
HGB BLD-MCNC: 13.6 G/DL (ref 13.5–17.5)
IMM GRANULOCYTES # BLD AUTO: 0 X10*3/UL (ref 0–0.7)
IMM GRANULOCYTES NFR BLD AUTO: 0 % (ref 0–0.9)
IRON SATN MFR SERPL: 31 % (ref 25–45)
IRON SERPL-MCNC: 100 UG/DL (ref 35–150)
LYMPHOCYTES # BLD AUTO: 0.81 X10*3/UL (ref 1.2–4.8)
LYMPHOCYTES NFR BLD AUTO: 32.4 %
MCH RBC QN AUTO: 29.5 PG (ref 26–34)
MCHC RBC AUTO-ENTMCNC: 33.3 G/DL (ref 32–36)
MCV RBC AUTO: 89 FL (ref 80–100)
MONOCYTES # BLD AUTO: 0.33 X10*3/UL (ref 0.1–1)
MONOCYTES NFR BLD AUTO: 13.2 %
NEUTROPHILS # BLD AUTO: 1.25 X10*3/UL (ref 1.2–7.7)
NEUTROPHILS NFR BLD AUTO: 50 %
NRBC BLD-RTO: 0 /100 WBCS (ref 0–0)
PLATELET # BLD AUTO: 201 X10*3/UL (ref 150–450)
POTASSIUM SERPL-SCNC: 4.3 MMOL/L (ref 3.5–5.3)
PROT SERPL-MCNC: 7.4 G/DL (ref 6.4–8.2)
RBC # BLD AUTO: 4.61 X10*6/UL (ref 4.5–5.9)
SODIUM SERPL-SCNC: 140 MMOL/L (ref 136–145)
TIBC SERPL-MCNC: 325 UG/DL (ref 240–445)
UIBC SERPL-MCNC: 225 UG/DL (ref 110–370)
VIT B12 SERPL-MCNC: 394 PG/ML (ref 211–911)
WBC # BLD AUTO: 2.5 X10*3/UL (ref 4.4–11.3)

## 2024-06-17 PROCEDURE — 83540 ASSAY OF IRON: CPT

## 2024-06-17 PROCEDURE — 85025 COMPLETE CBC W/AUTO DIFF WBC: CPT

## 2024-06-17 PROCEDURE — 82607 VITAMIN B-12: CPT

## 2024-06-17 PROCEDURE — 82728 ASSAY OF FERRITIN: CPT

## 2024-06-17 PROCEDURE — 36415 COLL VENOUS BLD VENIPUNCTURE: CPT

## 2024-06-17 PROCEDURE — G0452 MOLECULAR PATHOLOGY INTERPR: HCPCS | Performed by: NURSE PRACTITIONER

## 2024-06-17 PROCEDURE — 80053 COMPREHEN METABOLIC PANEL: CPT

## 2024-06-17 PROCEDURE — 83550 IRON BINDING TEST: CPT

## 2024-06-17 PROCEDURE — 81450 HL NEO GSAP 5-50DNA/DNA&RNA: CPT

## 2024-06-20 ENCOUNTER — OFFICE VISIT (OUTPATIENT)
Dept: HEMATOLOGY/ONCOLOGY | Facility: CLINIC | Age: 69
End: 2024-06-20
Payer: MEDICARE

## 2024-06-20 VITALS
OXYGEN SATURATION: 97 % | TEMPERATURE: 97.5 F | DIASTOLIC BLOOD PRESSURE: 84 MMHG | HEART RATE: 83 BPM | RESPIRATION RATE: 16 BRPM | WEIGHT: 192.02 LBS | BODY MASS INDEX: 28.31 KG/M2 | SYSTOLIC BLOOD PRESSURE: 124 MMHG

## 2024-06-20 DIAGNOSIS — D72.819 LEUKOPENIA, UNSPECIFIED TYPE: Primary | ICD-10-CM

## 2024-06-20 PROCEDURE — 1159F MED LIST DOCD IN RCRD: CPT | Performed by: PHYSICIAN ASSISTANT

## 2024-06-20 PROCEDURE — 99213 OFFICE O/P EST LOW 20 MIN: CPT | Performed by: PHYSICIAN ASSISTANT

## 2024-06-20 PROCEDURE — 3079F DIAST BP 80-89 MM HG: CPT | Performed by: PHYSICIAN ASSISTANT

## 2024-06-20 PROCEDURE — 1126F AMNT PAIN NOTED NONE PRSNT: CPT | Performed by: PHYSICIAN ASSISTANT

## 2024-06-20 PROCEDURE — 3008F BODY MASS INDEX DOCD: CPT | Performed by: PHYSICIAN ASSISTANT

## 2024-06-20 PROCEDURE — 1157F ADVNC CARE PLAN IN RCRD: CPT | Performed by: PHYSICIAN ASSISTANT

## 2024-06-20 PROCEDURE — 1036F TOBACCO NON-USER: CPT | Performed by: PHYSICIAN ASSISTANT

## 2024-06-20 PROCEDURE — 3074F SYST BP LT 130 MM HG: CPT | Performed by: PHYSICIAN ASSISTANT

## 2024-06-20 ASSESSMENT — PAIN SCALES - GENERAL: PAINLEVEL: 0-NO PAIN

## 2024-06-20 NOTE — PROGRESS NOTES
"Reason for Visit  Michael Chavez is a 68 y.o. AA male referred by Dr. Knott for evaluation and management of leucopenia w/o neutropenia or lymphopenia.     Patient recently moved her from [place] reports he has had leucopenia for most of his life. Noted to have anemia in 7/2023 and self prescribed oral iron. He takes 2-3 tabs a day. No iron studies drawn at the time.  Anemia corrected and in 10/2023, labs drawn and noted to have elevated ferritin (388) and iron stat of 42%. Patient had C-scope (11/15/2023) had sigmoid colon and rectal polyp.    On assessment, notes fatigue but doesn't have conventional sleep wake cycle. He works at night and sleeps from 5am to 1pm. He notes 8lb weight loss in 2 month mounika. Notes back pain and stressful job. Denies B symptoms, frequent infections or any other symptoms at this time.    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    History of Present Illness:  Patient presents for follow up. Feeling well. Started on Vitamin B12. Otherwise doing well.    Review of Systems: All of the systems have been reviewed and are negative for complaints except what is stated in the HPI and/or past medical history.    Allergies and Intolerances:  No Known Allergies         Outpatient Medication Profile:  Current Outpatient Medications   Medication Sig Dispense Refill    lidocaine (Lidocaine Viscous) 2 % solution       polyethylene glycol-electrolytes (Golytely) 420 gram solution Begin drinking first half of prep 6pm the night before procedure. Start second half 5 hours before procedure time. 4000 mL 0    rosuvastatin (Crestor) 5 mg tablet TAKE 1 TABLET BY MOUTH EVERY DAY 90 tablet 0     No current facility-administered medications for this visit.        Vitals and Measurements:       7/13/2023     7:29 AM 10/31/2023     2:21 PM 11/30/2023     1:53 PM 6/20/2024    11:54 AM   Vitals " "  Systolic 128 126 139 124   Diastolic 86 82 94 84   Heart Rate 83 110 95 83   Temp   37 °C (98.6 °F) 36.4 °C (97.5 °F)   Resp   18 16   Height (in) 1.753 m (5' 9\") 1.753 m (5' 9\") 1.754 m (5' 9.06\")     Weight (lb) 196 189 187.28 192.02   BMI 28.94 kg/m2 27.91 kg/m2 27.61 kg/m2 28.31 kg/m2   BSA (m2) 2.08 m2 2.04 m2 2.04 m2 2.06 m2   Visit Report Report Report Report Report       Significant value     Physical Exam:   Constitutional: alert, awake and oriented, not in acute distress   HEENT: moist mucous membranes, normal nose   Neck: supple, no lymphadenopathy   EYES: PERRL, EOM intact, conjunctiva normal  Skin: no jaundice, rash or erythema  Neurological: AAOx3, no gross focal deficit   Psychiatric: normal mood and behavior     Labs:  Lab Results   Component Value Date    WBC 2.5 (L) 06/17/2024    NEUTROABS 1.25 06/17/2024    IGABSOL 0.00 06/17/2024    LYMPHSABS 0.81 (L) 06/17/2024    MONOSABS 0.33 06/17/2024    EOSABS 0.09 06/17/2024    BASOSABS 0.02 06/17/2024    RBC 4.61 06/17/2024    MCV 89 06/17/2024    MCHC 33.3 06/17/2024    HGB 13.6 06/17/2024    HCT 40.9 (L) 06/17/2024     06/17/2024     No results found for: \"RETICCTPCT\"   Lab Results   Component Value Date    CREATININE 1.16 06/17/2024    BUN 14 06/17/2024    EGFR 69 06/17/2024     06/17/2024    K 4.3 06/17/2024     (H) 06/17/2024    CO2 25 06/17/2024      Lab Results   Component Value Date    ALT 16 06/17/2024    AST 17 06/17/2024    ALKPHOS 45 06/17/2024    BILITOT 0.9 06/17/2024      Lab Results   Component Value Date    TSH 2.52 10/12/2023     Lab Results   Component Value Date    TSH 2.52 10/12/2023     Lab Results   Component Value Date    IRON 100 06/17/2024    TIBC 325 06/17/2024    FERRITIN 351 (H) 06/17/2024      Lab Results   Component Value Date    IFNRFKDB52 394 06/17/2024      No results found for: \"FOLATE\"  Lab Results   Component Value Date    SARINA Positive (A) 11/30/2023    RF <10 11/30/2023     Lab Results   Component " Value Date    SPEP Aberrant band detected. See immunofixation. 11/30/2023     Lab Results   Component Value Date    IGG 1,340 11/30/2023    IGM 41 11/30/2023     11/30/2023         Assessment:    67 y.o. AA male referred for evaluation and management of leucopenia w/o neutropenia or lymphopenia.     Plan:    Reviewed and discussed lab, imaging, and pathology results with patient in detail as well as diagnosis, prognosis, and treatment options.    Long standing h/o leucopenia likely benign    Discussed role of BMBx    Recommend that he try to modify his sleep cycle if he can since it can be cause of fatigue.    Continue Vitmin B12    F/U w/PCP    RTC in 12 months    Patient verbalized understanding, and all his questions were answered to her satisfaction    30 min spent with patient greater than 50 % of which was spent in consultation and coordination of care via telehealth.

## 2024-06-21 LAB
ELECTRONICALLY SIGNED BY: NORMAL
MYELOID NGS RESULTS: NORMAL

## 2024-08-23 ENCOUNTER — LAB (OUTPATIENT)
Dept: LAB | Facility: LAB | Age: 69
End: 2024-08-23
Payer: MEDICARE

## 2024-08-23 DIAGNOSIS — R94.31 ABNORMAL ELECTROCARDIOGRAM (ECG) (EKG): Primary | ICD-10-CM

## 2024-08-23 DIAGNOSIS — R00.2 PALPITATIONS: ICD-10-CM

## 2024-08-23 DIAGNOSIS — E78.00 PURE HYPERCHOLESTEROLEMIA: ICD-10-CM

## 2024-08-23 LAB
CHOLEST SERPL-MCNC: 183 MG/DL (ref 0–199)
CHOLESTEROL/HDL RATIO: 3.2
HDLC SERPL-MCNC: 56.9 MG/DL
LDLC SERPL CALC-MCNC: 112 MG/DL
NON HDL CHOLESTEROL: 126 MG/DL (ref 0–149)
TRIGL SERPL-MCNC: 69 MG/DL (ref 0–149)
VLDL: 14 MG/DL (ref 0–40)

## 2024-08-23 PROCEDURE — 80061 LIPID PANEL: CPT

## 2024-10-11 ENCOUNTER — LAB (OUTPATIENT)
Dept: LAB | Facility: LAB | Age: 69
End: 2024-10-11
Payer: MEDICARE

## 2024-10-11 DIAGNOSIS — R97.20 RISING PSA LEVEL: Primary | ICD-10-CM

## 2024-10-14 ENCOUNTER — LAB (OUTPATIENT)
Dept: LAB | Facility: LAB | Age: 69
End: 2024-10-14
Payer: COMMERCIAL

## 2024-10-14 ENCOUNTER — APPOINTMENT (OUTPATIENT)
Dept: PRIMARY CARE | Facility: CLINIC | Age: 69
End: 2024-10-14
Payer: COMMERCIAL

## 2024-10-14 VITALS
DIASTOLIC BLOOD PRESSURE: 80 MMHG | SYSTOLIC BLOOD PRESSURE: 124 MMHG | HEIGHT: 69 IN | BODY MASS INDEX: 28.58 KG/M2 | HEART RATE: 98 BPM | WEIGHT: 193 LBS | OXYGEN SATURATION: 97 %

## 2024-10-14 DIAGNOSIS — Z12.5 SCREENING FOR PROSTATE CANCER: ICD-10-CM

## 2024-10-14 DIAGNOSIS — Z00.00 ENCOUNTER FOR ANNUAL WELLNESS EXAM IN MEDICARE PATIENT: ICD-10-CM

## 2024-10-14 DIAGNOSIS — Z00.00 ENCOUNTER FOR ANNUAL WELLNESS EXAM IN MEDICARE PATIENT: Primary | ICD-10-CM

## 2024-10-14 DIAGNOSIS — Z23 ENCOUNTER FOR IMMUNIZATION: ICD-10-CM

## 2024-10-14 LAB — PSA SERPL-MCNC: 1.79 NG/ML

## 2024-10-14 PROCEDURE — 3008F BODY MASS INDEX DOCD: CPT | Performed by: STUDENT IN AN ORGANIZED HEALTH CARE EDUCATION/TRAINING PROGRAM

## 2024-10-14 PROCEDURE — 1160F RVW MEDS BY RX/DR IN RCRD: CPT | Performed by: STUDENT IN AN ORGANIZED HEALTH CARE EDUCATION/TRAINING PROGRAM

## 2024-10-14 PROCEDURE — G0103 PSA SCREENING: HCPCS

## 2024-10-14 PROCEDURE — 1170F FXNL STATUS ASSESSED: CPT | Performed by: STUDENT IN AN ORGANIZED HEALTH CARE EDUCATION/TRAINING PROGRAM

## 2024-10-14 PROCEDURE — 3079F DIAST BP 80-89 MM HG: CPT | Performed by: STUDENT IN AN ORGANIZED HEALTH CARE EDUCATION/TRAINING PROGRAM

## 2024-10-14 PROCEDURE — 1159F MED LIST DOCD IN RCRD: CPT | Performed by: STUDENT IN AN ORGANIZED HEALTH CARE EDUCATION/TRAINING PROGRAM

## 2024-10-14 PROCEDURE — 1157F ADVNC CARE PLAN IN RCRD: CPT | Performed by: STUDENT IN AN ORGANIZED HEALTH CARE EDUCATION/TRAINING PROGRAM

## 2024-10-14 PROCEDURE — 90662 IIV NO PRSV INCREASED AG IM: CPT | Performed by: STUDENT IN AN ORGANIZED HEALTH CARE EDUCATION/TRAINING PROGRAM

## 2024-10-14 PROCEDURE — G0009 ADMIN PNEUMOCOCCAL VACCINE: HCPCS | Performed by: STUDENT IN AN ORGANIZED HEALTH CARE EDUCATION/TRAINING PROGRAM

## 2024-10-14 PROCEDURE — 1124F ACP DISCUSS-NO DSCNMKR DOCD: CPT | Performed by: STUDENT IN AN ORGANIZED HEALTH CARE EDUCATION/TRAINING PROGRAM

## 2024-10-14 PROCEDURE — 1036F TOBACCO NON-USER: CPT | Performed by: STUDENT IN AN ORGANIZED HEALTH CARE EDUCATION/TRAINING PROGRAM

## 2024-10-14 PROCEDURE — 3074F SYST BP LT 130 MM HG: CPT | Performed by: STUDENT IN AN ORGANIZED HEALTH CARE EDUCATION/TRAINING PROGRAM

## 2024-10-14 PROCEDURE — 90677 PCV20 VACCINE IM: CPT | Performed by: STUDENT IN AN ORGANIZED HEALTH CARE EDUCATION/TRAINING PROGRAM

## 2024-10-14 PROCEDURE — 36415 COLL VENOUS BLD VENIPUNCTURE: CPT

## 2024-10-14 PROCEDURE — G0439 PPPS, SUBSEQ VISIT: HCPCS | Performed by: STUDENT IN AN ORGANIZED HEALTH CARE EDUCATION/TRAINING PROGRAM

## 2024-10-14 PROCEDURE — 83036 HEMOGLOBIN GLYCOSYLATED A1C: CPT

## 2024-10-14 PROCEDURE — G0008 ADMIN INFLUENZA VIRUS VAC: HCPCS | Performed by: STUDENT IN AN ORGANIZED HEALTH CARE EDUCATION/TRAINING PROGRAM

## 2024-10-14 ASSESSMENT — ACTIVITIES OF DAILY LIVING (ADL)
MANAGING_FINANCES: INDEPENDENT
DOING_HOUSEWORK: INDEPENDENT
GROCERY_SHOPPING: INDEPENDENT
DRESSING: INDEPENDENT
TAKING_MEDICATION: INDEPENDENT
BATHING: INDEPENDENT

## 2024-10-14 ASSESSMENT — PATIENT HEALTH QUESTIONNAIRE - PHQ9
1. LITTLE INTEREST OR PLEASURE IN DOING THINGS: NOT AT ALL
SUM OF ALL RESPONSES TO PHQ9 QUESTIONS 1 AND 2: 0
2. FEELING DOWN, DEPRESSED OR HOPELESS: NOT AT ALL

## 2024-10-14 ASSESSMENT — ENCOUNTER SYMPTOMS
DEPRESSION: 0
LOSS OF SENSATION IN FEET: 0
OCCASIONAL FEELINGS OF UNSTEADINESS: 0

## 2024-10-15 LAB
EST. AVERAGE GLUCOSE BLD GHB EST-MCNC: 123 MG/DL
HBA1C MFR BLD: 5.9 %

## 2024-11-18 ENCOUNTER — LAB (OUTPATIENT)
Dept: LAB | Facility: LAB | Age: 69
End: 2024-11-18
Payer: MEDICARE

## 2024-11-18 DIAGNOSIS — R97.20 RISING PSA LEVEL: ICD-10-CM

## 2024-11-18 LAB — PSA SERPL-MCNC: 0.81 NG/ML

## 2024-11-18 PROCEDURE — 36415 COLL VENOUS BLD VENIPUNCTURE: CPT

## 2025-06-16 ENCOUNTER — TELEPHONE (OUTPATIENT)
Dept: HEMATOLOGY/ONCOLOGY | Facility: CLINIC | Age: 70
End: 2025-06-16
Payer: MEDICARE

## 2025-06-16 DIAGNOSIS — D50.9 IRON DEFICIENCY ANEMIA, UNSPECIFIED IRON DEFICIENCY ANEMIA TYPE: Primary | ICD-10-CM

## 2025-06-16 NOTE — TELEPHONE ENCOUNTER
Called and let pt know that he does need labs drawn prior to appt and orders are in. Patient agreed to plan and verbalized understanding using teach back method.

## 2025-06-16 NOTE — TELEPHONE ENCOUNTER
Reason for Conversation  Lab Orders    Background   Patient is not sure if he needs to have labs drawn prior to appointment on Thursday. There are no lab orders in the system.     Disposition   No disposition on file.

## 2025-06-17 PROCEDURE — 83550 IRON BINDING TEST: CPT

## 2025-06-17 PROCEDURE — 36415 COLL VENOUS BLD VENIPUNCTURE: CPT

## 2025-06-17 PROCEDURE — 82607 VITAMIN B-12: CPT

## 2025-06-17 PROCEDURE — 82728 ASSAY OF FERRITIN: CPT

## 2025-06-17 PROCEDURE — 83540 ASSAY OF IRON: CPT

## 2025-06-17 PROCEDURE — 85025 COMPLETE CBC W/AUTO DIFF WBC: CPT

## 2025-06-17 PROCEDURE — 80053 COMPREHEN METABOLIC PANEL: CPT

## 2025-06-18 ENCOUNTER — LAB (OUTPATIENT)
Dept: LAB | Facility: HOSPITAL | Age: 70
End: 2025-06-18
Payer: MEDICARE

## 2025-06-18 DIAGNOSIS — D50.9 IRON DEFICIENCY ANEMIA, UNSPECIFIED: Primary | ICD-10-CM

## 2025-06-18 LAB
ALBUMIN SERPL BCP-MCNC: 4.5 G/DL (ref 3.4–5)
ALP SERPL-CCNC: 52 U/L (ref 33–136)
ALT SERPL W P-5'-P-CCNC: 21 U/L (ref 10–52)
ANION GAP SERPL CALC-SCNC: 13 MMOL/L (ref 10–20)
AST SERPL W P-5'-P-CCNC: 18 U/L (ref 9–39)
BASOPHILS # BLD AUTO: 0.03 X10*3/UL (ref 0–0.1)
BASOPHILS NFR BLD AUTO: 1.2 %
BILIRUB SERPL-MCNC: 0.9 MG/DL (ref 0–1.2)
BUN SERPL-MCNC: 16 MG/DL (ref 6–23)
CALCIUM SERPL-MCNC: 9.8 MG/DL (ref 8.6–10.6)
CHLORIDE SERPL-SCNC: 105 MMOL/L (ref 98–107)
CO2 SERPL-SCNC: 24 MMOL/L (ref 21–32)
CREAT SERPL-MCNC: 1.05 MG/DL (ref 0.5–1.3)
EGFRCR SERPLBLD CKD-EPI 2021: 78 ML/MIN/1.73M*2
EOSINOPHIL # BLD AUTO: 0.08 X10*3/UL (ref 0–0.7)
EOSINOPHIL NFR BLD AUTO: 3.1 %
ERYTHROCYTE [DISTWIDTH] IN BLOOD BY AUTOMATED COUNT: 13.1 % (ref 11.5–14.5)
FERRITIN SERPL-MCNC: 350 NG/ML (ref 20–300)
GLUCOSE SERPL-MCNC: 88 MG/DL (ref 74–99)
HCT VFR BLD AUTO: 39.7 % (ref 41–52)
HGB BLD-MCNC: 12.8 G/DL (ref 13.5–17.5)
IMM GRANULOCYTES # BLD AUTO: 0 X10*3/UL (ref 0–0.7)
IMM GRANULOCYTES NFR BLD AUTO: 0 % (ref 0–0.9)
IRON SATN MFR SERPL: 31 % (ref 25–45)
IRON SERPL-MCNC: 107 UG/DL (ref 35–150)
LYMPHOCYTES # BLD AUTO: 0.83 X10*3/UL (ref 1.2–4.8)
LYMPHOCYTES NFR BLD AUTO: 32.2 %
MCH RBC QN AUTO: 29.3 PG (ref 26–34)
MCHC RBC AUTO-ENTMCNC: 32.2 G/DL (ref 32–36)
MCV RBC AUTO: 91 FL (ref 80–100)
MONOCYTES # BLD AUTO: 0.36 X10*3/UL (ref 0.1–1)
MONOCYTES NFR BLD AUTO: 14 %
NEUTROPHILS # BLD AUTO: 1.28 X10*3/UL (ref 1.2–7.7)
NEUTROPHILS NFR BLD AUTO: 49.5 %
NRBC BLD-RTO: 0 /100 WBCS (ref 0–0)
PLATELET # BLD AUTO: 200 X10*3/UL (ref 150–450)
POTASSIUM SERPL-SCNC: 4.3 MMOL/L (ref 3.5–5.3)
PROT SERPL-MCNC: 7.4 G/DL (ref 6.4–8.2)
RBC # BLD AUTO: 4.37 X10*6/UL (ref 4.5–5.9)
SODIUM SERPL-SCNC: 138 MMOL/L (ref 136–145)
TIBC SERPL-MCNC: 340 UG/DL (ref 240–445)
UIBC SERPL-MCNC: 233 UG/DL (ref 110–370)
VIT B12 SERPL-MCNC: 562 PG/ML (ref 211–911)
WBC # BLD AUTO: 2.6 X10*3/UL (ref 4.4–11.3)

## 2025-06-19 ENCOUNTER — OFFICE VISIT (OUTPATIENT)
Dept: HEMATOLOGY/ONCOLOGY | Facility: CLINIC | Age: 70
End: 2025-06-19
Payer: MEDICARE

## 2025-06-19 VITALS
TEMPERATURE: 97.5 F | SYSTOLIC BLOOD PRESSURE: 115 MMHG | HEIGHT: 68 IN | WEIGHT: 199.74 LBS | OXYGEN SATURATION: 95 % | DIASTOLIC BLOOD PRESSURE: 83 MMHG | HEART RATE: 97 BPM | BODY MASS INDEX: 30.27 KG/M2 | RESPIRATION RATE: 16 BRPM

## 2025-06-19 DIAGNOSIS — D50.9 NORMOCYTIC HYPOCHROMIC ANEMIA: ICD-10-CM

## 2025-06-19 DIAGNOSIS — K90.9 IDIOPATHIC STEATORRHEA (HHS-HCC): ICD-10-CM

## 2025-06-19 DIAGNOSIS — D72.819 LEUKOPENIA, UNSPECIFIED TYPE: ICD-10-CM

## 2025-06-19 PROCEDURE — 1157F ADVNC CARE PLAN IN RCRD: CPT | Performed by: PHYSICIAN ASSISTANT

## 2025-06-19 PROCEDURE — 3074F SYST BP LT 130 MM HG: CPT | Performed by: PHYSICIAN ASSISTANT

## 2025-06-19 PROCEDURE — 3008F BODY MASS INDEX DOCD: CPT | Performed by: PHYSICIAN ASSISTANT

## 2025-06-19 PROCEDURE — 3079F DIAST BP 80-89 MM HG: CPT | Performed by: PHYSICIAN ASSISTANT

## 2025-06-19 PROCEDURE — 1126F AMNT PAIN NOTED NONE PRSNT: CPT | Performed by: PHYSICIAN ASSISTANT

## 2025-06-19 PROCEDURE — 99213 OFFICE O/P EST LOW 20 MIN: CPT | Performed by: PHYSICIAN ASSISTANT

## 2025-06-19 PROCEDURE — 1159F MED LIST DOCD IN RCRD: CPT | Performed by: PHYSICIAN ASSISTANT

## 2025-06-19 ASSESSMENT — PAIN SCALES - GENERAL: PAINLEVEL_OUTOF10: 0-NO PAIN

## 2025-06-19 NOTE — PROGRESS NOTES
"Reason for Visit  Michael Chavez is a 69 y.o. AA male referred by Dr. Knott for evaluation and management of leucopenia w/o neutropenia or lymphopenia.     Patient recently moved her from [place] reports he has had leucopenia for most of his life. Noted to have anemia in 7/2023 and self prescribed oral iron. He takes 2-3 tabs a day. No iron studies drawn at the time.  Anemia corrected and in 10/2023, labs drawn and noted to have elevated ferritin (388) and iron stat of 42%. Patient had C-scope (11/15/2023) had sigmoid colon and rectal polyp.    On assessment, notes fatigue but doesn't have conventional sleep wake cycle. He works at night and sleeps from 5am to 1pm. He notes 8lb weight loss in 2 month mounika. Notes back pain and stressful job. Denies B symptoms, frequent infections or any other symptoms at this time.    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    History of Present Illness:  Patient presents for follow up. Feeling well. Is on oral iron and Vitamin B12. Otherwise doing well.    Review of Systems: All of the systems have been reviewed and are negative for complaints except what is stated in the HPI and/or past medical history.    Allergies and Intolerances:  No Known Allergies         Outpatient Medication Profile:  Current Outpatient Medications   Medication Sig Dispense Refill    rosuvastatin (Crestor) 5 mg tablet TAKE 1 TABLET BY MOUTH EVERY DAY 90 tablet 0     No current facility-administered medications for this visit.        Vitals and Measurements:       7/13/2023     7:29 AM 10/31/2023     2:21 PM 11/30/2023     1:53 PM 6/20/2024    11:54 AM 10/14/2024    11:29 AM 6/19/2025     1:33 PM   Vitals   Systolic 128 126 139 124 124 115   Diastolic 86 82 94 84 80 83   BP Location   Left arm Left arm Left arm Left arm   Heart Rate 83 110 95 83 98 97   Temp   37 °C (98.6 °F) 36.4 °C (97.5 °F)  " "36.4 °C (97.5 °F)   Resp   18 16  16   Height 1.753 m (5' 9\") 1.753 m (5' 9\") 1.754 m (5' 9.06\")   1.753 m (5' 9\") 1.726 m (5' 7.95\")    Weight (lb) 196 189 187.28 192.02 193 199.74   BMI 28.94 kg/m2 27.91 kg/m2 27.61 kg/m2 28.31 kg/m2 28.5 kg/m2 30.41 kg/m2   BSA (m2) 2.08 m2 2.04 m2 2.04 m2 2.06 m2 2.06 m2 2.08 m2   Visit Report Report Report Report Report Report Report       Significant value     Physical Exam:   Constitutional: alert, awake and oriented, not in acute distress   HEENT: moist mucous membranes, normal nose   Neck: supple, no lymphadenopathy   EYES: PERRL, EOM intact, conjunctiva normal  Skin: no jaundice, rash or erythema  Neurological: AAOx3, no gross focal deficit   Psychiatric: normal mood and behavior     Labs:  Lab Results   Component Value Date    WBC 2.6 (L) 06/17/2025    NEUTROABS 1.28 06/17/2025    IGABSOL 0.00 06/17/2025    LYMPHSABS 0.83 (L) 06/17/2025    MONOSABS 0.36 06/17/2025    EOSABS 0.08 06/17/2025    BASOSABS 0.03 06/17/2025    RBC 4.37 (L) 06/17/2025    MCV 91 06/17/2025    MCHC 32.2 06/17/2025    HGB 12.8 (L) 06/17/2025    HCT 39.7 (L) 06/17/2025     06/17/2025     No results found for: \"RETICCTPCT\"   Lab Results   Component Value Date    CREATININE 1.05 06/17/2025    BUN 16 06/17/2025    EGFR 78 06/17/2025     06/17/2025    K 4.3 06/17/2025     06/17/2025    CO2 24 06/17/2025      Lab Results   Component Value Date    ALT 21 06/17/2025    AST 18 06/17/2025    ALKPHOS 52 06/17/2025    BILITOT 0.9 06/17/2025      Lab Results   Component Value Date    TSH 2.52 10/12/2023     Lab Results   Component Value Date    TSH 2.52 10/12/2023     Lab Results   Component Value Date    IRON 107 06/17/2025    TIBC 340 06/17/2025    FERRITIN 350 (H) 06/17/2025      Lab Results   Component Value Date    [id number] 562 06/17/2025      No results found for: \"FOLATE\"  Lab Results   Component Value Date    SARINA Positive (A) 11/30/2023    RF <10 11/30/2023     Lab Results "   Component Value Date    SPEP Aberrant band detected. See immunofixation. 11/30/2023     Lab Results   Component Value Date    IGG 1,340 11/30/2023    IGM 41 11/30/2023     11/30/2023         Assessment:    67 y.o. AA male referred for evaluation and management of leucopenia w/o neutropenia or lymphopenia.     Plan:    Reviewed and discussed lab, imaging, and pathology results with patient in detail as well as diagnosis, prognosis, and treatment options.    Long standing h/o leucopenia likely benign; no intervention needed for now.    Discussed role of BMBx    Discussed stopping oral iron no PRATIBHA present    Continue Vitmin B12    F/U w/PCP    RTC in 12 months    Patient verbalized understanding, and all his questions were answered to her satisfaction    30 min spent with patient greater than 50 % of which was spent in consultation and coordination of care.

## 2026-06-18 ENCOUNTER — APPOINTMENT (OUTPATIENT)
Dept: HEMATOLOGY/ONCOLOGY | Facility: CLINIC | Age: 71
End: 2026-06-18
Payer: MEDICARE